# Patient Record
Sex: MALE | Race: WHITE | NOT HISPANIC OR LATINO | RURAL
[De-identification: names, ages, dates, MRNs, and addresses within clinical notes are randomized per-mention and may not be internally consistent; named-entity substitution may affect disease eponyms.]

---

## 2019-06-19 ENCOUNTER — OFFICE VISIT (OUTPATIENT)
Dept: FAMILY MEDICINE CLINIC | Facility: CLINIC | Age: 44
End: 2019-06-19

## 2019-06-19 VITALS
TEMPERATURE: 98 F | WEIGHT: 172.4 LBS | SYSTOLIC BLOOD PRESSURE: 142 MMHG | RESPIRATION RATE: 18 BRPM | DIASTOLIC BLOOD PRESSURE: 92 MMHG | OXYGEN SATURATION: 98 % | HEIGHT: 72 IN | HEART RATE: 83 BPM | BODY MASS INDEX: 23.35 KG/M2

## 2019-06-19 DIAGNOSIS — R39.15 URINARY URGENCY: Primary | ICD-10-CM

## 2019-06-19 DIAGNOSIS — N52.9 DIFFICULTY ATTAINING ERECTION: ICD-10-CM

## 2019-06-19 DIAGNOSIS — M77.01 MEDIAL EPICONDYLITIS OF RIGHT ELBOW: ICD-10-CM

## 2019-06-19 LAB
BILIRUB BLD-MCNC: NEGATIVE MG/DL
GLUCOSE UR STRIP-MCNC: NEGATIVE MG/DL
KETONES UR QL: NEGATIVE
LEUKOCYTE EST, POC: ABNORMAL
NITRITE UR-MCNC: NEGATIVE MG/ML
PH UR: 9 [PH] (ref 5–8)
PROT UR STRIP-MCNC: ABNORMAL MG/DL
RBC # UR STRIP: ABNORMAL /UL
SP GR UR: 1 (ref 1–1.03)
UROBILINOGEN UR QL: NORMAL

## 2019-06-19 PROCEDURE — 99214 OFFICE O/P EST MOD 30 MIN: CPT | Performed by: FAMILY MEDICINE

## 2019-06-19 PROCEDURE — 81002 URINALYSIS NONAUTO W/O SCOPE: CPT | Performed by: FAMILY MEDICINE

## 2019-06-19 RX ORDER — SILDENAFIL CITRATE 20 MG/1
20 TABLET ORAL DAILY
Qty: 25 TABLET | Refills: 3 | Status: SHIPPED | OUTPATIENT
Start: 2019-06-19 | End: 2019-06-19 | Stop reason: SDUPTHER

## 2019-06-19 RX ORDER — EPINEPHRINE 0.3 MG/.3ML
INJECTION SUBCUTANEOUS
Refills: 0 | COMMUNITY
Start: 2019-04-15

## 2019-06-19 RX ORDER — SILDENAFIL CITRATE 20 MG/1
20 TABLET ORAL DAILY
Qty: 25 TABLET | Refills: 3 | Status: SHIPPED | OUTPATIENT
Start: 2019-06-19 | End: 2021-07-19

## 2019-06-19 RX ORDER — FAMOTIDINE 20 MG/1
TABLET, FILM COATED ORAL
Refills: 0 | COMMUNITY
Start: 2019-04-15 | End: 2021-07-19

## 2019-06-19 RX ORDER — AMLODIPINE BESYLATE 10 MG/1
10 TABLET ORAL DAILY
COMMUNITY
End: 2021-07-19 | Stop reason: SDUPTHER

## 2019-06-19 RX ORDER — CIPROFLOXACIN 500 MG/1
500 TABLET, FILM COATED ORAL 2 TIMES DAILY
Qty: 10 TABLET | Refills: 0 | Status: SHIPPED | OUTPATIENT
Start: 2019-06-19 | End: 2019-06-24

## 2019-06-19 NOTE — ASSESSMENT & PLAN NOTE
He was started on Prednisone and NSAIDs to treat his symptoms.   He was advised to use ice and exercise.

## 2019-06-20 ENCOUNTER — RESULTS ENCOUNTER (OUTPATIENT)
Dept: FAMILY MEDICINE CLINIC | Facility: CLINIC | Age: 44
End: 2019-06-20

## 2019-06-20 DIAGNOSIS — R39.15 URINARY URGENCY: ICD-10-CM

## 2019-06-21 LAB
GLUCOSE UR QL: (no result)
KETONES UR QL STRIP: (no result)
PH UR STRIP: (no result) [PH]
PROT UR QL STRIP: (no result)
SP GR UR: (no result)
SPECIMEN STATUS: NORMAL

## 2019-06-27 LAB
BACTERIA UR CULT: NO GROWTH
BACTERIA UR CULT: NORMAL
WRITTEN AUTHORIZATION: NORMAL

## 2019-12-04 ENCOUNTER — HOSPITAL ENCOUNTER (OUTPATIENT)
Dept: GENERAL RADIOLOGY | Facility: HOSPITAL | Age: 44
Discharge: HOME | End: 2019-12-04
Admitting: FAMILY MEDICINE

## 2019-12-04 ENCOUNTER — OFFICE VISIT (OUTPATIENT)
Dept: FAMILY MEDICINE CLINIC | Facility: CLINIC | Age: 44
End: 2019-12-04

## 2019-12-04 VITALS
HEART RATE: 70 BPM | BODY MASS INDEX: 24.24 KG/M2 | DIASTOLIC BLOOD PRESSURE: 86 MMHG | SYSTOLIC BLOOD PRESSURE: 134 MMHG | WEIGHT: 179 LBS | OXYGEN SATURATION: 98 % | HEIGHT: 72 IN | TEMPERATURE: 98 F | RESPIRATION RATE: 18 BRPM

## 2019-12-04 DIAGNOSIS — R07.89 OTHER CHEST PAIN: ICD-10-CM

## 2019-12-04 DIAGNOSIS — R07.89 OTHER CHEST PAIN: Primary | ICD-10-CM

## 2019-12-04 PROCEDURE — 71046 X-RAY EXAM CHEST 2 VIEWS: CPT

## 2019-12-04 PROCEDURE — 99215 OFFICE O/P EST HI 40 MIN: CPT | Performed by: FAMILY MEDICINE

## 2019-12-04 RX ORDER — SILDENAFIL CITRATE 20 MG/1
TABLET ORAL
Refills: 2 | COMMUNITY
Start: 2019-09-27 | End: 2020-01-13 | Stop reason: SDUPTHER

## 2019-12-04 RX ORDER — AMLODIPINE BESYLATE 10 MG/1
10 TABLET ORAL DAILY
COMMUNITY
End: 2021-06-30

## 2019-12-04 NOTE — PROGRESS NOTES
Chief Complaint   Patient presents with   • Chest Pain     over the left side of the heart       History of Present Illness:  Steven Bazzi is a 44 y.o. male.   History of Present Illness   Chest Pain:   Patient reports that he has been having a squeezing chest pain in the left side of his chest; off and on for about a week- each episode lasts several minutes. He cannot relate this feeling to any stress, or alcohol consumption etc.    He states that he has some stabbing pain in the left side of his chest and up into his shoulder.   He reports that he has also had a cough, he isn't sure if it's related to the chest pain.   He also reports that he doesn't get adequate sleep because he works swing shift from nights/days.       Allergies:  No Known Allergies    Social History:  Social History     Socioeconomic History   • Marital status:      Spouse name: Not on file   • Number of children: Not on file   • Years of education: Not on file   • Highest education level: Not on file   Tobacco Use   • Smoking status: Current Every Day Smoker       Family History:  History reviewed. No pertinent family history.    Past Medical History :  Active Ambulatory Problems     Diagnosis Date Noted   • Other chest pain 12/28/2019     Resolved Ambulatory Problems     Diagnosis Date Noted   • No Resolved Ambulatory Problems     No Additional Past Medical History       Medication List:    Current Outpatient Medications:   •  amLODIPine (NORVASC) 10 MG tablet, Take 10 mg by mouth Daily., Disp: , Rfl:   •  sildenafil (REVATIO) 20 MG tablet, TAKE 1 TO 3 TABLETS BY MOUTH 30 PRIOR TO SEXUAL ACTIVITY, Disp: , Rfl: 2    Past Surgical History:  History reviewed. No pertinent surgical history.     The following portions of the patient's history were reviewed and updated as appropriate: allergies, current medications, past family history, past medical history, past social history, past surgical history and problem  "list.    Review Of Systems:  Review of Systems   Constitutional: Negative for activity change, appetite change and fatigue.   HENT: Negative for congestion, postnasal drip, sinus pressure and sore throat.    Eyes: Negative for blurred vision and itching.   Respiratory: Negative for cough, shortness of breath and wheezing.    Cardiovascular: Positive for chest pain.   Gastrointestinal: Negative for abdominal pain, constipation, nausea, vomiting and GERD.   Endocrine: Negative for cold intolerance and heat intolerance.   Genitourinary: Negative for difficulty urinating, dysuria and urinary incontinence.   Musculoskeletal: Negative for back pain, joint swelling and neck pain.   Skin: Negative for color change and rash.   Neurological: Negative for dizziness, speech difficulty, weakness and memory problem.   Psychiatric/Behavioral: Negative for behavioral problems, decreased concentration, suicidal ideas and depressed mood.       Physical Exam:  Vital Signs:  Vitals:    12/04/19 1155   BP: 134/86   Pulse: 70   Resp: 18   Temp: 98 °F (36.7 °C)   SpO2: 98%   Weight: 81.2 kg (179 lb)   Height: 182.9 cm (72\")     Body mass index is 24.28 kg/m².    Physical Exam   Constitutional: He is oriented to person, place, and time. He appears well-developed and well-nourished. He is active.   HENT:   Head: Normocephalic and atraumatic.   Nose: Nose normal.   Eyes: Pupils are equal, round, and reactive to light. Conjunctivae and lids are normal.   Neck: Normal range of motion. Neck supple.   Cardiovascular: Normal rate, regular rhythm, normal heart sounds and normal pulses.   Pulmonary/Chest: Effort normal and breath sounds normal. No respiratory distress.   Abdominal: Soft. Bowel sounds are normal.   Musculoskeletal: Normal range of motion.   Neurological: He is alert and oriented to person, place, and time.   Skin: Skin is warm and dry. Capillary refill takes less than 2 seconds.   Psychiatric: He has a normal mood and affect. His " behavior is normal. Judgment and thought content normal.   Vitals reviewed.        Assessment and Plan:  Diagnoses and all orders for this visit:    1. Other chest pain (Primary)  Assessment & Plan:  EKG was wnl.  Cardiac workup was wnl.  This could likely be due to costochondritis.   If this continues he will need to get a stress Echo.      Orders:  -     ECG 12 Lead; Future  -     XR Chest PA & Lateral  -     C-reactive Protein; Future  -     CBC Auto Differential; Future  -     CK Total & CKMB; Future  -     Troponin I; Future      Greater than 50% of visit spent on counseling and coordination of care regarding the patient's illness, along with the pros and cons of various treatment options.

## 2019-12-09 ENCOUNTER — RESULTS ENCOUNTER (OUTPATIENT)
Dept: FAMILY MEDICINE CLINIC | Facility: CLINIC | Age: 44
End: 2019-12-09

## 2019-12-09 DIAGNOSIS — R07.89 OTHER CHEST PAIN: ICD-10-CM

## 2019-12-28 PROBLEM — R07.89 OTHER CHEST PAIN: Status: ACTIVE | Noted: 2019-12-28

## 2019-12-28 NOTE — ASSESSMENT & PLAN NOTE
EKG was wnl.  Cardiac workup was wnl.  This could likely be due to costochondritis.   If this continues he will need to get a stress Echo.

## 2020-01-13 DIAGNOSIS — N52.9 DIFFICULTY ATTAINING ERECTION: Primary | ICD-10-CM

## 2020-01-13 RX ORDER — SILDENAFIL CITRATE 20 MG/1
TABLET ORAL
Qty: 60 TABLET | Refills: 2 | Status: SHIPPED | OUTPATIENT
Start: 2020-01-13 | End: 2021-06-30

## 2020-03-24 RX ORDER — AMLODIPINE BESYLATE 10 MG/1
TABLET ORAL
Qty: 90 TABLET | Refills: 3 | Status: SHIPPED | OUTPATIENT
Start: 2020-03-24 | End: 2021-06-30

## 2021-03-19 RX ORDER — AMLODIPINE BESYLATE 10 MG/1
TABLET ORAL
Qty: 90 TABLET | Refills: 3 | OUTPATIENT
Start: 2021-03-19

## 2021-06-07 DIAGNOSIS — N52.9 DIFFICULTY ATTAINING ERECTION: ICD-10-CM

## 2021-06-07 RX ORDER — SILDENAFIL CITRATE 20 MG/1
TABLET ORAL
Qty: 60 TABLET | Refills: 2 | OUTPATIENT
Start: 2021-06-07

## 2021-06-14 DIAGNOSIS — N52.9 DIFFICULTY ATTAINING ERECTION: ICD-10-CM

## 2021-06-14 NOTE — TELEPHONE ENCOUNTER
Caller: Rose Mary Pio KENISHA MARVIN    Relationship: Self    Best call back number: [phone]    Medication needed:   Requested Prescriptions     Pending Prescriptions Disp Refills   • sildenafil (REVATIO) 20 MG tablet 60 tablet 2     Sig: Take 1-3 tablets 30 minutes prior to sexual activity       When do you need the refill by: ASAP      Does the patient have less than a 3 day supply:  [x] Yes  [] No    What is the patient's preferred pharmacy: [facility] [place], [place] -[id number] [place], [place] - [address] [facility] [phone] [facility] [phone]

## 2021-06-16 RX ORDER — SILDENAFIL CITRATE 20 MG/1
TABLET ORAL
Qty: 60 TABLET | Refills: 2 | OUTPATIENT
Start: 2021-06-16

## 2021-06-30 ENCOUNTER — OFFICE VISIT (OUTPATIENT)
Dept: FAMILY MEDICINE CLINIC | Facility: CLINIC | Age: 46
End: 2021-06-30

## 2021-06-30 VITALS
HEIGHT: 70 IN | BODY MASS INDEX: 25.88 KG/M2 | WEIGHT: 180.8 LBS | HEART RATE: 86 BPM | RESPIRATION RATE: 18 BRPM | OXYGEN SATURATION: 96 % | DIASTOLIC BLOOD PRESSURE: 80 MMHG | TEMPERATURE: 97.3 F | SYSTOLIC BLOOD PRESSURE: 130 MMHG

## 2021-06-30 DIAGNOSIS — R19.7 DIARRHEA, UNSPECIFIED TYPE: ICD-10-CM

## 2021-06-30 DIAGNOSIS — R10.84 GENERALIZED ABDOMINAL DISCOMFORT: Primary | ICD-10-CM

## 2021-06-30 DIAGNOSIS — F17.200 TOBACCO USE DISORDER: ICD-10-CM

## 2021-06-30 DIAGNOSIS — K21.00 GASTROESOPHAGEAL REFLUX DISEASE WITH ESOPHAGITIS WITHOUT HEMORRHAGE: ICD-10-CM

## 2021-06-30 DIAGNOSIS — R35.0 URINARY FREQUENCY: ICD-10-CM

## 2021-06-30 PROBLEM — L40.9 PSORIASIS: Status: ACTIVE | Noted: 2021-06-30

## 2021-06-30 PROBLEM — I10 HYPERTENSION: Status: ACTIVE | Noted: 2021-06-30

## 2021-06-30 PROBLEM — M54.50 ACUTE MIDLINE LOW BACK PAIN WITHOUT SCIATICA: Status: ACTIVE | Noted: 2021-06-30

## 2021-06-30 LAB
BILIRUB BLD-MCNC: NEGATIVE MG/DL
CLARITY, POC: CLEAR
COLOR UR: ABNORMAL
GLUCOSE UR STRIP-MCNC: NEGATIVE MG/DL
KETONES UR QL: NEGATIVE
LEUKOCYTE EST, POC: NEGATIVE
NITRITE UR-MCNC: NEGATIVE MG/ML
PH UR: 6.5 [PH] (ref 5–8)
PROT UR STRIP-MCNC: NEGATIVE MG/DL
RBC # UR STRIP: ABNORMAL /UL
SP GR UR: 1.02 (ref 1–1.03)
UROBILINOGEN UR QL: NORMAL

## 2021-06-30 PROCEDURE — 99214 OFFICE O/P EST MOD 30 MIN: CPT | Performed by: FAMILY MEDICINE

## 2021-06-30 PROCEDURE — 81002 URINALYSIS NONAUTO W/O SCOPE: CPT | Performed by: FAMILY MEDICINE

## 2021-06-30 RX ORDER — DIPHENOXYLATE HYDROCHLORIDE AND ATROPINE SULFATE 2.5; .025 MG/1; MG/1
1 TABLET ORAL 3 TIMES DAILY PRN
Qty: 30 TABLET | Refills: 0 | Status: SHIPPED | OUTPATIENT
Start: 2021-06-30 | End: 2022-08-15

## 2021-06-30 RX ORDER — IBUPROFEN 800 MG/1
TABLET ORAL
COMMUNITY
Start: 2017-03-24 | End: 2021-07-19

## 2021-06-30 RX ORDER — GABAPENTIN 300 MG/1
1 CAPSULE ORAL 3 TIMES DAILY
COMMUNITY
Start: 2018-09-27 | End: 2021-06-30

## 2021-06-30 RX ORDER — OMEPRAZOLE 40 MG/1
40 CAPSULE, DELAYED RELEASE ORAL 2 TIMES DAILY
Qty: 60 CAPSULE | Refills: 12 | Status: SHIPPED | OUTPATIENT
Start: 2021-06-30 | End: 2021-09-07 | Stop reason: SDUPTHER

## 2021-07-01 LAB
ALBUMIN SERPL-MCNC: 4.3 G/DL (ref 4–5)
ALBUMIN/GLOB SERPL: 2 {RATIO} (ref 1.2–2.2)
ALP SERPL-CCNC: 71 IU/L (ref 48–121)
ALT SERPL-CCNC: 13 IU/L (ref 0–44)
AMYLASE SERPL-CCNC: 53 U/L (ref 31–110)
APPEARANCE UR: CLEAR
AST SERPL-CCNC: 16 IU/L (ref 0–40)
BACTERIA #/AREA URNS HPF: NORMAL /[HPF]
BASOPHILS # BLD AUTO: 0.1 X10E3/UL (ref 0–0.2)
BASOPHILS NFR BLD AUTO: 1 %
BILIRUB SERPL-MCNC: 0.3 MG/DL (ref 0–1.2)
BILIRUB UR QL STRIP: NEGATIVE
BUN SERPL-MCNC: 7 MG/DL (ref 6–24)
BUN/CREAT SERPL: 7 (ref 9–20)
CALCIUM SERPL-MCNC: 9.4 MG/DL (ref 8.7–10.2)
CASTS URNS QL MICRO: NORMAL /LPF
CHLORIDE SERPL-SCNC: 99 MMOL/L (ref 96–106)
CO2 SERPL-SCNC: 26 MMOL/L (ref 20–29)
COLOR UR: YELLOW
CREAT SERPL-MCNC: 1.01 MG/DL (ref 0.76–1.27)
EOSINOPHIL # BLD AUTO: 0.2 X10E3/UL (ref 0–0.4)
EOSINOPHIL NFR BLD AUTO: 3 %
EPI CELLS #/AREA URNS HPF: NORMAL /HPF (ref 0–10)
ERYTHROCYTE [DISTWIDTH] IN BLOOD BY AUTOMATED COUNT: 13 % (ref 11.6–15.4)
GLOBULIN SER CALC-MCNC: 2.2 G/DL (ref 1.5–4.5)
GLUCOSE SERPL-MCNC: 77 MG/DL (ref 65–99)
GLUCOSE UR QL: NEGATIVE
HCT VFR BLD AUTO: 48.7 % (ref 37.5–51)
HGB BLD-MCNC: 15.9 G/DL (ref 13–17.7)
HGB UR QL STRIP: ABNORMAL
IMM GRANULOCYTES # BLD AUTO: 0 X10E3/UL (ref 0–0.1)
IMM GRANULOCYTES NFR BLD AUTO: 0 %
KETONES UR QL STRIP: ABNORMAL
LEUKOCYTE ESTERASE UR QL STRIP: NEGATIVE
LIPASE SERPL-CCNC: 32 U/L (ref 13–78)
LYMPHOCYTES # BLD AUTO: 1.3 X10E3/UL (ref 0.7–3.1)
LYMPHOCYTES NFR BLD AUTO: 17 %
MCH RBC QN AUTO: 30.2 PG (ref 26.6–33)
MCHC RBC AUTO-ENTMCNC: 32.6 G/DL (ref 31.5–35.7)
MCV RBC AUTO: 93 FL (ref 79–97)
MICRO URNS: ABNORMAL
MONOCYTES # BLD AUTO: 1.1 X10E3/UL (ref 0.1–0.9)
MONOCYTES NFR BLD AUTO: 14 %
NEUTROPHILS # BLD AUTO: 5 X10E3/UL (ref 1.4–7)
NEUTROPHILS NFR BLD AUTO: 65 %
NITRITE UR QL STRIP: NEGATIVE
PH UR STRIP: 6.5 [PH] (ref 5–7.5)
PLATELET # BLD AUTO: 284 X10E3/UL (ref 150–450)
POTASSIUM SERPL-SCNC: 4.3 MMOL/L (ref 3.5–5.2)
PROT SERPL-MCNC: 6.5 G/DL (ref 6–8.5)
PROT UR QL STRIP: ABNORMAL
RBC # BLD AUTO: 5.26 X10E6/UL (ref 4.14–5.8)
RBC #/AREA URNS HPF: NORMAL /HPF (ref 0–2)
SODIUM SERPL-SCNC: 139 MMOL/L (ref 134–144)
SP GR UR: 1.02 (ref 1–1.03)
UROBILINOGEN UR STRIP-MCNC: 0.2 MG/DL (ref 0.2–1)
WBC # BLD AUTO: 7.7 X10E3/UL (ref 3.4–10.8)
WBC #/AREA URNS HPF: NORMAL /HPF (ref 0–5)

## 2021-07-02 ENCOUNTER — TELEPHONE (OUTPATIENT)
Dept: FAMILY MEDICINE CLINIC | Facility: CLINIC | Age: 46
End: 2021-07-02

## 2021-07-02 RX ORDER — MONTELUKAST SODIUM 4 MG/1
1 TABLET, CHEWABLE ORAL 2 TIMES DAILY
Qty: 60 TABLET | Refills: 0 | Status: SHIPPED | OUTPATIENT
Start: 2021-07-02

## 2021-07-02 NOTE — TELEPHONE ENCOUNTER
Spoke with Pio he reports he continue to have loose stool.    Discussed with Pio, per Dr. Pierre medication was sent to pharmacy to help make stool bulky, and should   supplies from this office to do stool cx and   C. Diff at [facility]    Pio states he is out of town and will be out of town until Monday,  He will pick rx up then ,and if continue to have loose stool at that time will stop by office Tuesday for supplies for stool testing.

## 2021-07-19 ENCOUNTER — OFFICE VISIT (OUTPATIENT)
Dept: FAMILY MEDICINE CLINIC | Facility: CLINIC | Age: 46
End: 2021-07-19

## 2021-07-19 VITALS
BODY MASS INDEX: 25.65 KG/M2 | WEIGHT: 179.2 LBS | HEIGHT: 70 IN | HEART RATE: 78 BPM | SYSTOLIC BLOOD PRESSURE: 110 MMHG | RESPIRATION RATE: 18 BRPM | TEMPERATURE: 98.1 F | DIASTOLIC BLOOD PRESSURE: 70 MMHG | OXYGEN SATURATION: 95 %

## 2021-07-19 DIAGNOSIS — I10 ESSENTIAL HYPERTENSION: Primary | ICD-10-CM

## 2021-07-19 DIAGNOSIS — N52.9 DIFFICULTY ATTAINING ERECTION: ICD-10-CM

## 2021-07-19 DIAGNOSIS — R10.84 GENERALIZED ABDOMINAL PAIN: ICD-10-CM

## 2021-07-19 PROCEDURE — 99214 OFFICE O/P EST MOD 30 MIN: CPT | Performed by: FAMILY MEDICINE

## 2021-07-19 RX ORDER — SUCRALFATE 1 G/1
1 TABLET ORAL 4 TIMES DAILY
Qty: 120 TABLET | Refills: 12 | Status: SHIPPED | OUTPATIENT
Start: 2021-07-19 | End: 2021-09-07 | Stop reason: SDUPTHER

## 2021-07-19 RX ORDER — AMLODIPINE BESYLATE 10 MG/1
10 TABLET ORAL DAILY
Qty: 90 TABLET | Refills: 2 | Status: SHIPPED | OUTPATIENT
Start: 2021-07-19 | End: 2021-09-07 | Stop reason: SDUPTHER

## 2021-07-19 RX ORDER — DICYCLOMINE HYDROCHLORIDE 10 MG/1
10 CAPSULE ORAL
Qty: 90 CAPSULE | Refills: 2 | Status: SHIPPED | OUTPATIENT
Start: 2021-07-19 | End: 2021-09-07 | Stop reason: SDUPTHER

## 2021-07-19 RX ORDER — TADALAFIL 10 MG/1
10 TABLET ORAL DAILY PRN
Qty: 90 TABLET | Refills: 3 | Status: SHIPPED | OUTPATIENT
Start: 2021-07-19 | End: 2022-08-15 | Stop reason: SDUPTHER

## 2021-07-19 NOTE — ASSESSMENT & PLAN NOTE
Patient was started on Carafate to help treat symptoms.  Patient was also started on dicyclomine for abdominal pain and diarrhea.  Patient was encouraged to return to clinic if symptoms do not improve.

## 2021-07-19 NOTE — PROGRESS NOTES
Chief Complaint  Abdominal Pain    Subjective    History of Present Illness        Pio Bazzi II presents to [facility] for   Abdominal Pain  This is a recurrent problem. The current episode started more than 1 month ago. The onset quality is gradual. The problem occurs intermittently. The problem has been gradually improving. The pain is located in the generalized abdominal region (lower mid abdominal issues ). The pain is at a severity of 3/10. The pain is mild. The quality of the pain is aching. Associated symptoms include diarrhea (on and off ) and flatus. Pertinent negatives include no anorexia, arthralgias, belching, constipation, dysuria, fever, frequency, headaches, hematochezia, hematuria, melena, myalgias, nausea, vomiting or weight loss. Associated symptoms comments: Patient states that he was here a month or so ago and he states that he was seen by another provider and he was very very gassy and he states that he had some abdominal discomfort and he states that he had a lot of diarrhea as well. He states that he was started in Nexium to treat and he states that it has helped him but he states that he Is still gassy and he states that his stomach just does not feel right. He states that his acid reflux is a lot better but he states that he feels there is something more. He states that at times it feels he has eating  the spiciest hot wing and it is sitting there. . The pain is aggravated by NSAIDs and certain positions. The pain is relieved by belching. The treatment provided mild relief.   Hypertension  This is a chronic problem. The current episode started more than 1 year ago. The problem is unchanged. The problem is controlled. Pertinent negatives include no chest pain, headaches, peripheral edema or shortness of breath. Agents associated with hypertension include NSAIDs. Risk factors for coronary artery disease include male gender, sedentary lifestyle and family  "history. Past treatments include direct vasodilators. Current antihypertension treatment includes lifestyle changes and calcium channel blockers. There are no compliance problems.  There is no history of CVA or heart failure. There is no history of a hypertension causing med, pheochromocytoma, renovascular disease or sleep apnea.   Erectile Dysfunction  This is a chronic problem. The current episode started more than 1 year ago. The nature of his difficulty is maintaining erection and achieving erection. Irritative symptoms do not include frequency. Pertinent negatives include no dysuria or hematuria. Nothing aggravates the symptoms. Past treatments include sildenafil. The treatment provided mild relief. He has had flushing and headaches caused by medications.        Objective   Vital Signs:   Visit Vitals  /70   Pulse 78   Temp 98.1 °F (36.7 °C)   Resp 18   Ht 177.8 cm (70\")   Wt 81.3 kg (179 lb 3.2 oz)   SpO2 95%   BMI 25.71 kg/m²       Physical Exam  Vitals reviewed.   Constitutional:       Appearance: He is well-developed.   HENT:      Head: Normocephalic.      Right Ear: External ear normal.      Left Ear: External ear normal.      Nose: Nose normal.   Eyes:      Conjunctiva/sclera: Conjunctivae normal.   Cardiovascular:      Rate and Rhythm: Normal rate and regular rhythm.   Pulmonary:      Effort: Pulmonary effort is normal.      Breath sounds: Normal breath sounds.   Abdominal:      General: Abdomen is flat. Bowel sounds are normal.      Palpations: Abdomen is soft.      Tenderness: There is no guarding.   Musculoskeletal:         General: Normal range of motion.      Cervical back: Normal range of motion and neck supple.   Skin:     General: Skin is warm and dry.      Capillary Refill: Capillary refill takes less than 2 seconds.   Neurological:      Mental Status: He is alert and oriented to person, place, and time.            Result Review :                  Recent Results (from the past 1344 " hour(s))   CBC & Differential    Collection Time: 06/30/21 12:15 PM    Specimen: Blood    BLOOD  MANUAL DIFFEREN   Result Value Ref Range    WBC 7.7 3.4 - 10.8 x10E3/uL    RBC 5.26 4.14 - 5.80 x10E6/uL    Hemoglobin 15.9 13.0 - 17.7 g/dL    Hematocrit 48.7 37.5 - 51.0 %    MCV 93 79 - 97 fL    MCH 30.2 26.6 - 33.0 pg    MCHC 32.6 31.5 - 35.7 g/dL    RDW 13.0 11.6 - 15.4 %    Platelets 284 150 - 450 x10E3/uL    Neutrophil Rel % 65 Not Estab. %    Lymphocyte Rel % 17 Not Estab. %    Monocyte Rel % 14 Not Estab. %    Eosinophil Rel % 3 Not Estab. %    Basophil Rel % 1 Not Estab. %    Neutrophils Absolute 5.0 1.4 - 7.0 x10E3/uL    Lymphocytes Absolute 1.3 0.7 - 3.1 x10E3/uL    Monocytes Absolute 1.1 (H) 0.1 - 0.9 x10E3/uL    Eosinophils Absolute 0.2 0.0 - 0.4 x10E3/uL    Basophils Absolute 0.1 0.0 - 0.2 x10E3/uL    Immature Granulocyte Rel % 0 Not Estab. %    Immature Grans Absolute 0.0 0.0 - 0.1 x10E3/uL   Comprehensive Metabolic Panel    Collection Time: 06/30/21 12:15 PM    Specimen: Blood    BLOOD  MANUAL DIFFEREN   Result Value Ref Range    Glucose 77 65 - 99 mg/dL    BUN 7 6 - 24 mg/dL    Creatinine 1.01 0.76 - 1.27 mg/dL    eGFR Non African Am 89 >59 mL/min/1.73    eGFR African Am 103 >59 mL/min/1.73    BUN/Creatinine Ratio 7 (L) 9 - 20    Sodium 139 134 - 144 mmol/L    Potassium 4.3 3.5 - 5.2 mmol/L    Chloride 99 96 - 106 mmol/L    Total CO2 26 20 - 29 mmol/L    Calcium 9.4 8.7 - 10.2 mg/dL    Total Protein 6.5 6.0 - 8.5 g/dL    Albumin 4.3 4.0 - 5.0 g/dL    Globulin 2.2 1.5 - 4.5 g/dL    A/G Ratio 2.0 1.2 - 2.2    Total Bilirubin 0.3 0.0 - 1.2 mg/dL    Alkaline Phosphatase 71 48 - 121 IU/L    AST (SGOT) 16 0 - 40 IU/L    ALT (SGPT) 13 0 - 44 IU/L   Amylase    Collection Time: 06/30/21 12:15 PM    Specimen: Blood    BLOOD  MANUAL DIFFEREN   Result Value Ref Range    Amylase 53 31 - 110 U/L   Lipase    Collection Time: 06/30/21 12:15 PM    Specimen: Blood    BLOOD  MANUAL DIFFEREN   Result Value Ref Range     Lipase 32 13 - 78 U/L   POCT urinalysis dipstick, manual    Collection Time: 06/30/21 12:16 PM    Specimen: Urine   Result Value Ref Range    Color Dark Yellow Yellow, Straw, Dark Yellow, Meg    Clarity, UA Clear Clear    Glucose, UA Negative Negative, 1000 mg/dL (3+) mg/dL    Bilirubin Negative Negative    Ketones, UA Negative Negative    Specific Gravity  1.025 1.005 - 1.030    Blood, UA 3+ (A) Negative    pH, Urine 6.5 5.0 - 8.0    Protein, POC Negative Negative mg/dL    Urobilinogen, UA Normal Normal    Leukocytes Negative Negative    Nitrite, UA Negative Negative   Urinalysis With Microscopic - Urine, Clean Catch    Collection Time: 06/30/21  5:18 PM    Specimen: Urine, Clean Catch    URINE  RELEASE TO FIDEL   Result Value Ref Range    Specific Gravity, UA 1.023 1.005 - 1.030    pH, UA 6.5 5.0 - 7.5    Color, UA Yellow Yellow    Appearance, UA Clear Clear    Leukocytes, UA Negative Negative    Protein Trace Negative/Trace    Glucose, UA Negative Negative    Ketones Trace (A) Negative    Blood, UA 1+ (A) Negative    Bilirubin, UA Negative Negative    Urobilinogen, UA 0.2 0.2 - 1.0 mg/dL    Nitrite, UA Negative Negative    Microscopic Examination See below:    Microscopic Examination -    Collection Time: 06/30/21  5:18 PM    URINE  RELEASE TO FIDEL   Result Value Ref Range    WBC, UA None seen 0 - 5 /hpf    RBC, UA None seen 0 - 2 /hpf    Epithelial Cells (non renal) None seen 0 - 10 /hpf    Casts None seen None seen /lpf    Bacteria, UA None seen None seen/Few       Assessment and Plan      Diagnoses and all orders for this visit:    1. Essential hypertension (Primary)  Assessment & Plan:  Hypertension is improving with treatment.  Continue current treatment regimen.  Dietary sodium restriction.  Weight loss.  Regular aerobic exercise.  Blood pressure will be reassessed at the next regular appointment.    Orders:  -     amLODIPine (NORVASC) 10 MG tablet; Take 1 tablet by mouth Daily.  Dispense: 90 tablet;  Refill: 2    2. Generalized abdominal pain  Assessment & Plan:  Patient was started on Carafate to help treat symptoms.  Patient was also started on dicyclomine for abdominal pain and diarrhea.  Patient was encouraged to return to clinic if symptoms do not improve.    Orders:  -     sucralfate (CARAFATE) 1 g tablet; Take 1 tablet by mouth 4 (Four) Times a Day.  Dispense: 120 tablet; Refill: 12  -     dicyclomine (Bentyl) 10 MG capsule; Take 1 capsule by mouth 4 (Four) Times a Day Before Meals & at Bedtime.  Dispense: 90 capsule; Refill: 2    3. Difficulty attaining erection  Assessment & Plan:  Patient was advised to stop sildenafil and was started on tadalafil.    Orders:  -     tadalafil (CIALIS) 10 MG tablet; Take 1 tablet by mouth Daily As Needed for Erectile Dysfunction.  Dispense: 90 tablet; Refill: 3           Follow Up   No follow-ups on file.  Patient was given instructions and counseling regarding his condition or for health maintenance advice. Please see specific information pulled into the AVS if appropriate.

## 2021-09-07 DIAGNOSIS — R10.84 GENERALIZED ABDOMINAL PAIN: ICD-10-CM

## 2021-09-07 DIAGNOSIS — I10 ESSENTIAL HYPERTENSION: ICD-10-CM

## 2021-09-07 RX ORDER — OMEPRAZOLE 40 MG/1
40 CAPSULE, DELAYED RELEASE ORAL 2 TIMES DAILY
Qty: 180 CAPSULE | Refills: 3 | Status: SHIPPED | OUTPATIENT
Start: 2021-09-07 | End: 2021-12-03 | Stop reason: SDUPTHER

## 2021-09-08 RX ORDER — DICYCLOMINE HYDROCHLORIDE 10 MG/1
10 CAPSULE ORAL
Qty: 90 CAPSULE | Refills: 2 | Status: SHIPPED | OUTPATIENT
Start: 2021-09-08 | End: 2022-08-15

## 2021-09-08 RX ORDER — AMLODIPINE BESYLATE 10 MG/1
10 TABLET ORAL DAILY
Qty: 90 TABLET | Refills: 2 | Status: SHIPPED | OUTPATIENT
Start: 2021-09-08 | End: 2021-11-03 | Stop reason: SDUPTHER

## 2021-09-08 RX ORDER — SUCRALFATE 1 G/1
1 TABLET ORAL 4 TIMES DAILY
Qty: 120 TABLET | Refills: 12 | Status: SHIPPED | OUTPATIENT
Start: 2021-09-08 | End: 2022-03-12 | Stop reason: SDUPTHER

## 2021-11-03 DIAGNOSIS — I10 ESSENTIAL HYPERTENSION: ICD-10-CM

## 2021-11-03 RX ORDER — AMLODIPINE BESYLATE 10 MG/1
10 TABLET ORAL DAILY
Qty: 90 TABLET | Refills: 2 | Status: SHIPPED | OUTPATIENT
Start: 2021-11-03 | End: 2021-11-29 | Stop reason: SDUPTHER

## 2021-11-03 NOTE — TELEPHONE ENCOUNTER
Caller: Pio Bazzi KENISHA MARVIN    Relationship: Self    Requested Prescriptions:   Requested Prescriptions     Pending Prescriptions Disp Refills   • amLODIPine (NORVASC) 10 MG tablet 90 tablet 2     Sig: Take 1 tablet by mouth Daily.      Pharmacy where request should be sent:   [id number] - [place], [place] - [address] AT [facility] OF  &  - [phone] PH - [phone] FX  [phone]    Additional details provided by patient: PATIENT IS REQUESTING A BRIDGE SUPPLY ONLY.  HE STATES THAT HIS FIRST REQUEST FOR THIS MEDICATION TO [facility], SENT VIA [employer], IS STILL PENDING AND HAS NOT BEEN APPROVED/PROCESSED AND HE IS OUT OF MEDICATION.    Best call back number: [phone] .    Does the patient have less than a 3 day supply:  [x] Yes  [] No    Serena Cui Rep   11/03/21 11:29 EDT

## 2021-11-29 DIAGNOSIS — I10 ESSENTIAL HYPERTENSION: ICD-10-CM

## 2021-11-29 RX ORDER — AMLODIPINE BESYLATE 10 MG/1
10 TABLET ORAL DAILY
Qty: 90 TABLET | Refills: 2 | Status: SHIPPED | OUTPATIENT
Start: 2021-11-29 | End: 2022-03-12 | Stop reason: SDUPTHER

## 2021-12-03 RX ORDER — OMEPRAZOLE 40 MG/1
40 CAPSULE, DELAYED RELEASE ORAL 2 TIMES DAILY
Qty: 180 CAPSULE | Refills: 3 | Status: SHIPPED | OUTPATIENT
Start: 2021-12-03 | End: 2021-12-06 | Stop reason: SDUPTHER

## 2021-12-06 RX ORDER — OMEPRAZOLE 40 MG/1
40 CAPSULE, DELAYED RELEASE ORAL 2 TIMES DAILY
Qty: 180 CAPSULE | Refills: 3 | Status: SHIPPED | OUTPATIENT
Start: 2021-12-06 | End: 2022-03-12 | Stop reason: SDUPTHER

## 2021-12-06 NOTE — TELEPHONE ENCOUNTER
Caller: Pio Bazzi II    Relationship: Self    Best call back number: [phone]    Requested Prescriptions:   Requested Prescriptions     Pending Prescriptions Disp Refills   • omeprazole (priLOSEC) 40 MG capsule 180 capsule 3     Sig: Take 1 capsule by mouth 2 (Two) Times a Day.        Pharmacy where request should be sent: [id number] [place] [address] [facility] OF  [address] [facility] - [phone] [facility] [phone] FX     Additional details provided by patient: PATIENT HAS 1 PILL LEFT ANDS IS WAITING ON MAIL ORDER. REQUESTING A SHORT TERM SUPPLY SENT TO LOCAL PHARMACY     Does the patient have less than a 3 day supply:  [] Yes  [] No    Serena Peña Rep   12/06/21 15:30 EST

## 2022-01-21 ENCOUNTER — HOSPITAL ENCOUNTER (OUTPATIENT)
Dept: GENERAL RADIOLOGY | Facility: HOSPITAL | Age: 47
Discharge: HOME | End: 2022-01-21
Admitting: FAMILY MEDICINE

## 2022-01-21 ENCOUNTER — OFFICE VISIT (OUTPATIENT)
Dept: FAMILY MEDICINE CLINIC | Facility: CLINIC | Age: 47
End: 2022-01-21

## 2022-01-21 VITALS
OXYGEN SATURATION: 98 % | TEMPERATURE: 98.2 F | SYSTOLIC BLOOD PRESSURE: 132 MMHG | HEART RATE: 82 BPM | BODY MASS INDEX: 26.66 KG/M2 | HEIGHT: 70 IN | DIASTOLIC BLOOD PRESSURE: 90 MMHG | WEIGHT: 186.2 LBS | RESPIRATION RATE: 14 BRPM

## 2022-01-21 DIAGNOSIS — R07.81 RIB PAIN ON LEFT SIDE: Primary | ICD-10-CM

## 2022-01-21 DIAGNOSIS — F17.200 TOBACCO USE DISORDER: ICD-10-CM

## 2022-01-21 PROCEDURE — 99214 OFFICE O/P EST MOD 30 MIN: CPT | Performed by: FAMILY MEDICINE

## 2022-01-21 PROCEDURE — 71101 X-RAY EXAM UNILAT RIBS/CHEST: CPT

## 2022-01-21 RX ORDER — BUPROPION HYDROCHLORIDE 150 MG/1
150 TABLET, EXTENDED RELEASE ORAL 2 TIMES DAILY
Qty: 180 TABLET | Refills: 2 | Status: SHIPPED | OUTPATIENT
Start: 2022-01-21 | End: 2022-03-12 | Stop reason: SDUPTHER

## 2022-01-21 RX ORDER — BUPROPION HYDROCHLORIDE 150 MG/1
150 TABLET ORAL DAILY
Qty: 30 TABLET | Refills: 0 | Status: CANCELLED | OUTPATIENT
Start: 2022-01-21

## 2022-01-21 RX ORDER — BUPROPION HYDROCHLORIDE 150 MG/1
150 TABLET ORAL DAILY
COMMUNITY
End: 2022-01-21

## 2022-02-23 PROBLEM — R07.81 RIB PAIN ON LEFT SIDE: Status: ACTIVE | Noted: 2022-02-23

## 2022-02-23 NOTE — ASSESSMENT & PLAN NOTE
Due to patient's pain an x-ray was ordered and reviewed.  Patient was advised to use heat, NSAIDs and Voltaren gel to help treat his symptoms.  Patient was encouraged to return to clinic if her symptoms did not improve.

## 2022-02-23 NOTE — ASSESSMENT & PLAN NOTE
Patient was started on Wellbutrin to help treat his symptoms of tobacco abuse.  Patient was encouraged to return to clinic if his symptoms did not improve.

## 2022-03-12 DIAGNOSIS — I10 ESSENTIAL HYPERTENSION: ICD-10-CM

## 2022-03-12 DIAGNOSIS — R10.84 GENERALIZED ABDOMINAL PAIN: ICD-10-CM

## 2022-03-12 DIAGNOSIS — F17.200 TOBACCO USE DISORDER: ICD-10-CM

## 2022-03-12 RX ORDER — OMEPRAZOLE 40 MG/1
40 CAPSULE, DELAYED RELEASE ORAL 2 TIMES DAILY
Qty: 180 CAPSULE | Refills: 3 | Status: SHIPPED | OUTPATIENT
Start: 2022-03-12 | End: 2022-04-06 | Stop reason: SDUPTHER

## 2022-03-12 RX ORDER — BUPROPION HYDROCHLORIDE 150 MG/1
150 TABLET, EXTENDED RELEASE ORAL 2 TIMES DAILY
Qty: 180 TABLET | Refills: 2 | Status: SHIPPED | OUTPATIENT
Start: 2022-03-12 | End: 2022-04-06 | Stop reason: SDUPTHER

## 2022-03-12 RX ORDER — AMLODIPINE BESYLATE 10 MG/1
10 TABLET ORAL DAILY
Qty: 90 TABLET | Refills: 2 | Status: SHIPPED | OUTPATIENT
Start: 2022-03-12 | End: 2022-04-06 | Stop reason: SDUPTHER

## 2022-03-12 RX ORDER — SUCRALFATE 1 G/1
1 TABLET ORAL 4 TIMES DAILY
Qty: 120 TABLET | Refills: 12 | Status: SHIPPED | OUTPATIENT
Start: 2022-03-12 | End: 2022-08-15

## 2022-04-06 DIAGNOSIS — F17.200 TOBACCO USE DISORDER: ICD-10-CM

## 2022-04-06 DIAGNOSIS — I10 ESSENTIAL HYPERTENSION: ICD-10-CM

## 2022-04-06 DIAGNOSIS — K21.00 GASTROESOPHAGEAL REFLUX DISEASE WITH ESOPHAGITIS WITHOUT HEMORRHAGE: Primary | ICD-10-CM

## 2022-04-06 RX ORDER — BUPROPION HYDROCHLORIDE 150 MG/1
150 TABLET, EXTENDED RELEASE ORAL 2 TIMES DAILY
Qty: 180 TABLET | Refills: 2 | Status: SHIPPED | OUTPATIENT
Start: 2022-04-06 | End: 2022-08-15

## 2022-04-06 RX ORDER — AMLODIPINE BESYLATE 10 MG/1
10 TABLET ORAL DAILY
Qty: 90 TABLET | Refills: 2 | Status: SHIPPED | OUTPATIENT
Start: 2022-04-06 | End: 2022-10-17 | Stop reason: SDUPTHER

## 2022-04-06 RX ORDER — OMEPRAZOLE 40 MG/1
40 CAPSULE, DELAYED RELEASE ORAL 2 TIMES DAILY
Qty: 180 CAPSULE | Refills: 3 | Status: SHIPPED | OUTPATIENT
Start: 2022-04-06 | End: 2022-10-17 | Stop reason: SDUPTHER

## 2022-04-12 ENCOUNTER — TELEPHONE (OUTPATIENT)
Dept: FAMILY MEDICINE CLINIC | Facility: CLINIC | Age: 47
End: 2022-04-12

## 2022-04-12 NOTE — TELEPHONE ENCOUNTER
LMOM to notify patient that unfortunately Dr Gusman' panel is full and he will be added to the wait list to see one of the new physicians coming to the practice in August and September.

## 2022-07-30 DIAGNOSIS — N52.9 DIFFICULTY ATTAINING ERECTION: ICD-10-CM

## 2022-08-02 RX ORDER — TADALAFIL 10 MG/1
TABLET ORAL
Qty: 90 TABLET | Refills: 3 | OUTPATIENT
Start: 2022-08-02

## 2022-08-15 ENCOUNTER — PATIENT ROUNDING (BHMG ONLY) (OUTPATIENT)
Dept: FAMILY MEDICINE CLINIC | Facility: CLINIC | Age: 47
End: 2022-08-15

## 2022-08-15 ENCOUNTER — OFFICE VISIT (OUTPATIENT)
Dept: FAMILY MEDICINE CLINIC | Facility: CLINIC | Age: 47
End: 2022-08-15

## 2022-08-15 VITALS
HEIGHT: 72 IN | HEART RATE: 91 BPM | RESPIRATION RATE: 18 BRPM | WEIGHT: 180 LBS | OXYGEN SATURATION: 97 % | TEMPERATURE: 96.7 F | BODY MASS INDEX: 24.38 KG/M2 | DIASTOLIC BLOOD PRESSURE: 90 MMHG | SYSTOLIC BLOOD PRESSURE: 140 MMHG

## 2022-08-15 DIAGNOSIS — N52.9 DIFFICULTY ATTAINING ERECTION: ICD-10-CM

## 2022-08-15 DIAGNOSIS — Z12.5 SCREENING FOR PROSTATE CANCER: ICD-10-CM

## 2022-08-15 DIAGNOSIS — I10 PRIMARY HYPERTENSION: Primary | ICD-10-CM

## 2022-08-15 DIAGNOSIS — H93.13 TINNITUS OF BOTH EARS: ICD-10-CM

## 2022-08-15 PROBLEM — H93.19 TINNITUS: Status: ACTIVE | Noted: 2022-08-15

## 2022-08-15 PROCEDURE — 99214 OFFICE O/P EST MOD 30 MIN: CPT | Performed by: FAMILY MEDICINE

## 2022-08-15 RX ORDER — TADALAFIL 10 MG/1
10 TABLET ORAL DAILY PRN
Qty: 90 TABLET | Refills: 3 | Status: SHIPPED | OUTPATIENT
Start: 2022-08-15 | End: 2022-08-15 | Stop reason: SDUPTHER

## 2022-08-15 RX ORDER — TADALAFIL 10 MG/1
10 TABLET ORAL DAILY PRN
Qty: 90 TABLET | Refills: 3 | Status: SHIPPED | OUTPATIENT
Start: 2022-08-15 | End: 2023-03-20 | Stop reason: SDUPTHER

## 2022-08-15 RX ORDER — LISINOPRIL 10 MG/1
10 TABLET ORAL DAILY
Qty: 90 TABLET | Refills: 2 | Status: SHIPPED | OUTPATIENT
Start: 2022-08-15 | End: 2022-10-17 | Stop reason: SDUPTHER

## 2022-08-15 RX ORDER — TADALAFIL 10 MG/1
10 TABLET ORAL DAILY PRN
Qty: 90 TABLET | Refills: 3 | Status: CANCELLED | OUTPATIENT
Start: 2022-08-15

## 2022-08-15 NOTE — ASSESSMENT & PLAN NOTE
Hypertension is worsening.  Dietary sodium restriction.  Weight loss.  Regular aerobic exercise.  Stop smoking.  Medication changes per orders.  Blood pressure will be reassessed in 3 months.

## 2022-08-15 NOTE — PROGRESS NOTES
A My-Chart message has been sent to the patient for PATIENT ROUNDING with [facility]

## 2022-08-15 NOTE — ASSESSMENT & PLAN NOTE
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  He was advised to get a hearing test at work and compare his last hearing test.  Patient was given the option to be seen by ENT but he declined.

## 2022-08-15 NOTE — PROGRESS NOTES
Chief Complaint  Chief Complaint   Patient presents with   • Establish Care     New Pt   • Tinnitus     Pt c/o of ringing in ears x 3 months       Subjective    History of Present Illness   {CC  Problem List  Visit Diagnosis   Encounters  Notes  Medications  Labs  Result Review Imaging  Media :23}     Pio Bazzi II presents to [facility] for   Hypertension  This is a chronic problem. The current episode started more than 1 year ago. The problem has been gradually worsening since onset. The problem is uncontrolled. Pertinent negatives include no chest pain, headaches, malaise/fatigue, neck pain, palpitations or shortness of breath. Agents associated with hypertension include NSAIDs. Risk factors for coronary artery disease include male gender and smoking/tobacco exposure. Past treatments include calcium channel blockers. Current antihypertension treatment includes calcium channel blockers. The current treatment provides mild improvement. There are no compliance problems.  There is no history of angina, CAD/MI, CVA or heart failure. There is no history of chronic renal disease, hypercortisolism, hyperparathyroidism, pheochromocytoma or a thyroid problem.   Tinnitus  This is a new problem. The current episode started more than 1 month ago. The problem occurs constantly. The problem has been gradually worsening. Pertinent negatives include no abdominal pain, anorexia, arthralgias, chest pain, fatigue, fever, headaches, neck pain, sore throat, urinary symptoms or vertigo. 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 He has tried nothing for the symptoms.   Erectile Dysfunction  This is a chronic problem. The current episode started more than 1 year ago. The problem is unchanged. The nature of his difficulty is maintaining erection. He reports no decreased libido. Irritative symptoms do not include nocturia or urgency. Obstructive symptoms do not include dribbling or an intermittent  "stream. Pertinent negatives include no dysuria, genital pain or hesitancy. Nothing aggravates the symptoms. Past treatments include tadalafil. The treatment provided significant relief. He has had no adverse reactions caused by medications. Risk factors include hypertension and tobacco use.        Objective   Vital Signs:   Visit Vitals  /90   Pulse 91   Temp 96.7 °F (35.9 °C)   Resp 18   Ht 182.9 cm (72\")   Wt 81.6 kg (180 lb)   SpO2 97%   BMI 24.41 kg/m²       Physical Exam  Vitals reviewed.   Constitutional:       Appearance: He is well-developed.   HENT:      Head: Normocephalic.      Right Ear: External ear normal.      Left Ear: External ear normal.      Nose: Nose normal.   Eyes:      Conjunctiva/sclera: Conjunctivae normal.   Cardiovascular:      Rate and Rhythm: Normal rate and regular rhythm.   Pulmonary:      Effort: Pulmonary effort is normal.      Breath sounds: Normal breath sounds.   Musculoskeletal:         General: Normal range of motion.      Cervical back: Normal range of motion and neck supple.   Skin:     General: Skin is warm and dry.      Capillary Refill: Capillary refill takes less than 2 seconds.   Neurological:      Mental Status: He is alert and oriented to person, place, and time.            Result Review :                    Assessment and Plan      Diagnoses and all orders for this visit:    1. Primary hypertension (Primary)  Assessment & Plan:  Hypertension is worsening.  Dietary sodium restriction.  Weight loss.  Regular aerobic exercise.  Stop smoking.  Medication changes per orders.  Blood pressure will be reassessed in 3 months.    Orders:  -     lisinopril (PRINIVIL,ZESTRIL) 10 MG tablet; Take 1 tablet by mouth Daily.  Dispense: 90 tablet; Refill: 2  -     CBC & Differential  -     Comprehensive Metabolic Panel  -     Lipid Panel With / Chol / HDL Ratio  -     TSH    2. Difficulty attaining erection  Assessment & Plan:  Patient was given a refill of tadalafil to treat his " symptoms.  No further treatment needed at this time.    Orders:  -     Discontinue: tadalafil (CIALIS) 10 MG tablet; Take 1 tablet by mouth Daily As Needed for Erectile Dysfunction.  Dispense: 90 tablet; Refill: 3  -     tadalafil (CIALIS) 10 MG tablet; Take 1 tablet by mouth Daily As Needed for Erectile Dysfunction.  Dispense: 90 tablet; Refill: 3    3. 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  He was advised to get a hearing test at work and compare his last hearing test.  Patient was given the option to be seen by ENT but he declined.      4. Screening for prostate cancer  -     PSA Screen           Follow Up   No follow-ups on file.  Patient was given instructions and counseling regarding his condition or for health maintenance advice. Please see specific information pulled into the AVS if appropriate.

## 2022-08-15 NOTE — TELEPHONE ENCOUNTER
Rx Refill Note  Requested Prescriptions     Pending Prescriptions Disp Refills   • tadalafil (CIALIS) 10 MG tablet 90 tablet 3     Sig: Take 1 tablet by mouth Daily As Needed for Erectile Dysfunction.      Last office visit with prescribing clinician: 1/21/2022      Next office visit with prescribing clinician: 8/15/2022            Tammy Hutton MA  08/15/22, 09:25 EDT

## 2022-08-15 NOTE — ASSESSMENT & PLAN NOTE
Patient was given a refill of tadalafil to treat his symptoms.  No further treatment needed at this time.

## 2022-08-16 LAB
ALBUMIN SERPL-MCNC: 4.8 G/DL (ref 4–5)
ALBUMIN/GLOB SERPL: 1.8 {RATIO} (ref 1.2–2.2)
ALP SERPL-CCNC: 79 IU/L (ref 44–121)
ALT SERPL-CCNC: 15 IU/L (ref 0–44)
AST SERPL-CCNC: 17 IU/L (ref 0–40)
BASOPHILS # BLD AUTO: 0.1 X10E3/UL (ref 0–0.2)
BASOPHILS NFR BLD AUTO: 0 %
BILIRUB SERPL-MCNC: 0.3 MG/DL (ref 0–1.2)
BUN SERPL-MCNC: 15 MG/DL (ref 6–24)
BUN/CREAT SERPL: 15 (ref 9–20)
CALCIUM SERPL-MCNC: 9.6 MG/DL (ref 8.7–10.2)
CHLORIDE SERPL-SCNC: 102 MMOL/L (ref 96–106)
CHOLEST SERPL-MCNC: 263 MG/DL (ref 100–199)
CHOLEST/HDLC SERPL: 3.7 RATIO (ref 0–5)
CO2 SERPL-SCNC: 27 MMOL/L (ref 20–29)
CREAT SERPL-MCNC: 1.02 MG/DL (ref 0.76–1.27)
EGFRCR-CYS SERPLBLD CKD-EPI 2021: 91 ML/MIN/1.73
EOSINOPHIL # BLD AUTO: 0.2 X10E3/UL (ref 0–0.4)
EOSINOPHIL NFR BLD AUTO: 2 %
ERYTHROCYTE [DISTWIDTH] IN BLOOD BY AUTOMATED COUNT: 12.6 % (ref 11.6–15.4)
GLOBULIN SER CALC-MCNC: 2.6 G/DL (ref 1.5–4.5)
GLUCOSE SERPL-MCNC: 86 MG/DL (ref 65–99)
HCT VFR BLD AUTO: 50.1 % (ref 37.5–51)
HDLC SERPL-MCNC: 72 MG/DL
HGB BLD-MCNC: 16.6 G/DL (ref 13–17.7)
IMM GRANULOCYTES # BLD AUTO: 0 X10E3/UL (ref 0–0.1)
IMM GRANULOCYTES NFR BLD AUTO: 0 %
LDLC SERPL CALC-MCNC: 143 MG/DL (ref 0–99)
LYMPHOCYTES # BLD AUTO: 1.7 X10E3/UL (ref 0.7–3.1)
LYMPHOCYTES NFR BLD AUTO: 14 %
MCH RBC QN AUTO: 30.2 PG (ref 26.6–33)
MCHC RBC AUTO-ENTMCNC: 33.1 G/DL (ref 31.5–35.7)
MCV RBC AUTO: 91 FL (ref 79–97)
MONOCYTES # BLD AUTO: 1.1 X10E3/UL (ref 0.1–0.9)
MONOCYTES NFR BLD AUTO: 8 %
NEUTROPHILS # BLD AUTO: 9.5 X10E3/UL (ref 1.4–7)
NEUTROPHILS NFR BLD AUTO: 76 %
PLATELET # BLD AUTO: 299 X10E3/UL (ref 150–450)
POTASSIUM SERPL-SCNC: 4.8 MMOL/L (ref 3.5–5.2)
PROT SERPL-MCNC: 7.4 G/DL (ref 6–8.5)
PSA SERPL-MCNC: 0.7 NG/ML (ref 0–4)
RBC # BLD AUTO: 5.49 X10E6/UL (ref 4.14–5.8)
SODIUM SERPL-SCNC: 140 MMOL/L (ref 134–144)
TRIGL SERPL-MCNC: 270 MG/DL (ref 0–149)
TSH SERPL DL<=0.005 MIU/L-ACNC: 1.24 UIU/ML (ref 0.45–4.5)
VLDLC SERPL CALC-MCNC: 48 MG/DL (ref 5–40)
WBC # BLD AUTO: 12.6 X10E3/UL (ref 3.4–10.8)

## 2022-08-17 ENCOUNTER — TELEPHONE (OUTPATIENT)
Dept: FAMILY MEDICINE CLINIC | Facility: CLINIC | Age: 47
End: 2022-08-17

## 2022-08-17 DIAGNOSIS — E78.2 MIXED HYPERLIPIDEMIA: Primary | ICD-10-CM

## 2022-08-17 RX ORDER — ATORVASTATIN CALCIUM 40 MG/1
40 TABLET, FILM COATED ORAL DAILY
Qty: 90 TABLET | Refills: 2 | Status: SHIPPED | OUTPATIENT
Start: 2022-08-17 | End: 2022-09-16 | Stop reason: SDUPTHER

## 2022-08-17 NOTE — TELEPHONE ENCOUNTER
----- Message from Alexey Box Sr., MD sent at 8/17/2022  9:42 AM EDT -----  Can you call this patient and let him know that his cholesterol was high and we need to start him on medicine to reduce his cholesterol level. Tell him he may need to monitor his diet and not have a high cholesterol meals.    Thanks

## 2022-09-16 ENCOUNTER — OFFICE VISIT (OUTPATIENT)
Dept: FAMILY MEDICINE CLINIC | Facility: CLINIC | Age: 47
End: 2022-09-16

## 2022-09-16 VITALS
RESPIRATION RATE: 17 BRPM | BODY MASS INDEX: 24.24 KG/M2 | HEART RATE: 92 BPM | DIASTOLIC BLOOD PRESSURE: 84 MMHG | SYSTOLIC BLOOD PRESSURE: 118 MMHG | OXYGEN SATURATION: 98 % | HEIGHT: 72 IN | TEMPERATURE: 96.8 F | WEIGHT: 179 LBS

## 2022-09-16 DIAGNOSIS — K42.9 UMBILICAL HERNIA WITHOUT OBSTRUCTION AND WITHOUT GANGRENE: ICD-10-CM

## 2022-09-16 DIAGNOSIS — E78.2 MIXED HYPERLIPIDEMIA: Primary | ICD-10-CM

## 2022-09-16 PROCEDURE — 99214 OFFICE O/P EST MOD 30 MIN: CPT | Performed by: FAMILY MEDICINE

## 2022-09-16 RX ORDER — ATORVASTATIN CALCIUM 40 MG/1
40 TABLET, FILM COATED ORAL DAILY
Qty: 90 TABLET | Refills: 3 | Status: SHIPPED | OUTPATIENT
Start: 2022-09-16 | End: 2023-01-02 | Stop reason: SDUPTHER

## 2022-09-16 NOTE — PROGRESS NOTES
"Chief Complaint  Chief Complaint   Patient presents with   • Labs Only     Pt here to discuss labs and treatment       Subjective    History of Present Illness   {CC  Problem List  Visit Diagnosis   Encounters  Notes  Medications  Labs  Result Review Imaging  Media :23}     Pio Bazzi II presents to [facility] for   History of Present Illness  Patient is a 47-year-old male that is being seen in the clinic for an umbilical hernia.  Patient states he has had this for over a year gradually worsened.  He states that its become more painful with activity.  He wanted to know if there was something he could do about the hernia.  Hyperlipidemia  This is a chronic problem. The current episode started more than 1 year ago. The problem is uncontrolled. Recent lipid tests were reviewed and are high. He has no history of diabetes, hypothyroidism or obesity. Pertinent negatives include no chest pain, leg pain, myalgias or shortness of breath. He is currently on no antihyperlipidemic treatment. Risk factors for coronary artery disease include male sex, stress and dyslipidemia.        Objective   Vital Signs:   Visit Vitals  /84   Pulse 92   Temp 96.8 °F (36 °C)   Resp 17   Ht 182.9 cm (72\")   Wt 81.2 kg (179 lb)   SpO2 98%   BMI 24.28 kg/m²       BMI is within normal parameters. No other follow-up for BMI required.     Physical Exam  Vitals reviewed.   Constitutional:       Appearance: He is well-developed.   HENT:      Head: Normocephalic.      Right Ear: External ear normal.      Left Ear: External ear normal.      Nose: Nose normal.   Eyes:      Conjunctiva/sclera: Conjunctivae normal.   Cardiovascular:      Rate and Rhythm: Normal rate and regular rhythm.   Pulmonary:      Effort: Pulmonary effort is normal.      Breath sounds: Normal breath sounds.   Musculoskeletal:         General: Normal range of motion.      Cervical back: Normal range of motion and neck supple.   Skin:   "   General: Skin is warm and dry.      Capillary Refill: Capillary refill takes less than 2 seconds.   Neurological:      Mental Status: He is alert and oriented to person, place, and time.            Result Review :                    Assessment and Plan   {CC Problem List  Visit Diagnosis  ROS  Review (Popup)  Health Maintenance  Quality  BestPractice  Medications  SmartSets  SnapShot Encounters  Media :23}   Diagnoses and all orders for this visit:    1. Mixed hyperlipidemia (Primary)  Assessment & Plan:  Lipid abnormalities are worsening.  Nutritional counseling was provided. and Pharmacotherapy as ordered.  Lipids will be reassessed in 3 months.    Orders:  -     atorvastatin (LIPITOR) 40 MG tablet; Take 1 tablet by mouth Daily.  Dispense: 90 tablet; Refill: 3    2. Umbilical hernia without obstruction and without gangrene  Assessment & Plan:  Worsening   we will refer patient to general surgery for further evaluation and treatment.             Follow Up   No follow-ups on file.  Patient was given instructions and counseling regarding his condition or for health maintenance advice. Please see specific information pulled into the AVS if appropriate.       Answers for HPI/ROS submitted by the patient on 9/16/2022  What is the primary reason for your visit?: Other  Please describe your symptoms.: My test results concerning high cholesterol and blood counts.  Have you had these symptoms before?: No  How long have you been having these symptoms?: Greater than 2 weeks

## 2022-09-19 PROBLEM — E78.2 MIXED HYPERLIPIDEMIA: Status: ACTIVE | Noted: 2022-09-19

## 2022-09-19 PROBLEM — K42.9 UMBILICAL HERNIA WITHOUT OBSTRUCTION AND WITHOUT GANGRENE: Status: ACTIVE | Noted: 2022-09-19

## 2022-10-17 DIAGNOSIS — I10 ESSENTIAL HYPERTENSION: ICD-10-CM

## 2022-10-17 DIAGNOSIS — I10 PRIMARY HYPERTENSION: ICD-10-CM

## 2022-10-17 DIAGNOSIS — K21.00 GASTROESOPHAGEAL REFLUX DISEASE WITH ESOPHAGITIS WITHOUT HEMORRHAGE: ICD-10-CM

## 2022-10-17 RX ORDER — LISINOPRIL 10 MG/1
10 TABLET ORAL DAILY
Qty: 90 TABLET | Refills: 2 | Status: SHIPPED | OUTPATIENT
Start: 2022-10-17

## 2022-10-17 RX ORDER — AMLODIPINE BESYLATE 10 MG/1
10 TABLET ORAL DAILY
Qty: 90 TABLET | Refills: 2 | Status: SHIPPED | OUTPATIENT
Start: 2022-10-17 | End: 2022-12-05 | Stop reason: SDUPTHER

## 2022-10-17 RX ORDER — OMEPRAZOLE 40 MG/1
40 CAPSULE, DELAYED RELEASE ORAL 2 TIMES DAILY
Qty: 180 CAPSULE | Refills: 3 | Status: SHIPPED | OUTPATIENT
Start: 2022-10-17 | End: 2023-03-20 | Stop reason: SDUPTHER

## 2022-10-18 ENCOUNTER — TELEPHONE (OUTPATIENT)
Dept: FAMILY MEDICINE CLINIC | Facility: CLINIC | Age: 47
End: 2022-10-18

## 2022-10-18 NOTE — TELEPHONE ENCOUNTER
"If patients call back to schedule an appointment please sched him as a same day per his previous message     \"For the last month my tailbone has been hurting (bruised like feeling) when I squeeze that muscle it hurts near my bottom.     I have also been pooping blood, at least 3 xs. In the past month.:\"       HUB OKAY TO READ AND SCHEDULE  "

## 2022-11-03 ENCOUNTER — OFFICE VISIT (OUTPATIENT)
Dept: FAMILY MEDICINE CLINIC | Facility: CLINIC | Age: 47
End: 2022-11-03

## 2022-11-03 VITALS
TEMPERATURE: 97.3 F | SYSTOLIC BLOOD PRESSURE: 130 MMHG | HEART RATE: 82 BPM | HEIGHT: 72 IN | DIASTOLIC BLOOD PRESSURE: 80 MMHG | RESPIRATION RATE: 17 BRPM | BODY MASS INDEX: 25.19 KG/M2 | OXYGEN SATURATION: 97 % | WEIGHT: 186 LBS

## 2022-11-03 DIAGNOSIS — M53.3 COCCYDYNIA: ICD-10-CM

## 2022-11-03 DIAGNOSIS — K62.5 RECTAL BLEEDING: Primary | ICD-10-CM

## 2022-11-03 DIAGNOSIS — Z12.11 ENCOUNTER FOR SCREENING COLONOSCOPY: ICD-10-CM

## 2022-11-03 PROCEDURE — 99214 OFFICE O/P EST MOD 30 MIN: CPT | Performed by: FAMILY MEDICINE

## 2022-11-03 NOTE — PROGRESS NOTES
"Chief Complaint  Chief Complaint   Patient presents with   • Pain     Pt c/o pain on tailbone x 2 months    • Rectal Bleeding     Pt c/o bleeding when wiping on and off x 2 months      Pio Bazzi is a 47-year-old male that presents at today's visit for coccydynia and hematochezia.    Subjective    History of Present Illness        Pio Bazzi II presents to [facility] for   History of Present Illness  Coccydynia  Mr. Bazzi states that he experiences pain on the tip of his coccyx. He shares that while sitting does not cause him pain, but squeezing his buttocks does. He denies ever falling. He denies being able to recall what caused the initial coccydynia. He confirms waking up 1 day with coccydynia. The patients states that his current pain level is between a 6 or 7 when he squeezes. When he is not squeezing he does not experience any pain. He denies experiencing pain further downwards than the coccyx. The patient denies taking ibuprofen for pain relief.     Hematochezia  Mr. Bazzi complains of hematochezia while using the restroom. He states that the blood is bright red when he wipes, but does not fill the toilet bowl. This issue has been going on for a month to month and a half. The patient denies experiencing hematochezia during every bowel movement. He report that the hematochezia has only occurred 2 or 3 times, but that it is noticeable when it occurs.  Mr. Bazzi denies experiencing hard or large stools or straining during bowel movements.          Objective   Vital Signs:   Visit Vitals  /80   Pulse 82   Temp 97.3 °F (36.3 °C)   Resp 17   Ht 182.9 cm (72\")   Wt 84.4 kg (186 lb)   SpO2 97%   BMI 25.23 kg/m²       BMI is >= 25 and <30. (Overweight) The following options were offered after discussion;: weight loss educational material (shared in after visit summary)     Physical Exam  Vitals reviewed.   Constitutional:       Appearance: He is well-developed.   HENT:      " Head: Normocephalic.      Right Ear: External ear normal.      Left Ear: External ear normal.      Nose: Nose normal.   Eyes:      Conjunctiva/sclera: Conjunctivae normal.   Cardiovascular:      Rate and Rhythm: Normal rate and regular rhythm.   Pulmonary:      Effort: Pulmonary effort is normal.      Breath sounds: Normal breath sounds.   Musculoskeletal:         General: Normal range of motion.      Cervical back: Normal range of motion and neck supple.   Skin:     General: Skin is warm and dry.      Capillary Refill: Capillary refill takes less than 2 seconds.   Neurological:      Mental Status: He is alert and oriented to person, place, and time.              Result Review :                      Assessment and Plan      Diagnoses and all orders for this visit:    1. Rectal bleeding (Primary)  Assessment & Plan:  -The patient will be referred to get colonoscopy screening.   -The patient is advised to not strain during his bowel movements.   -The patient is advised to increase fiber intake like nuts, vegetables, and fiber supplements.      2. Coccydynia  Assessment & Plan:  -The patient is advised to try ibuprofen for pain and discomfort.  -The patient is advised to apply heat to the area of discomfort.       3. Encounter for screening colonoscopy  -     Ambulatory Referral For Screening Colonoscopy           Follow Up   No follow-ups on file.  Patient was given instructions and counseling regarding his condition or for health maintenance advice. Please see specific information pulled into the AVS if appropriate.       Answers for HPI/ROS submitted by the patient on 11/3/2022  What is the primary reason for your visit?: Other  Please describe your symptoms.: Pain in tailbone and bleeding  Have you had these symptoms before?: No  How long have you been having these symptoms?: Greater than 2 weeks      Transcribed from ambient dictation for Alexey Box Sr, MD by Michaelle Puente.  11/03/22   15:01  EDT    Patient or patient representative verbalized consent to the visit recording.  I have personally performed the services described in this document as transcribed by the above individual, and it is both accurate and complete.

## 2022-11-06 PROBLEM — M53.3 COCCYDYNIA: Status: ACTIVE | Noted: 2022-11-06

## 2022-11-06 PROBLEM — K62.5 RECTAL BLEEDING: Status: ACTIVE | Noted: 2022-11-06

## 2022-11-06 NOTE — ASSESSMENT & PLAN NOTE
-The patient is advised to try ibuprofen for pain and discomfort.  -The patient is advised to apply heat to the area of discomfort.

## 2022-11-06 NOTE — ASSESSMENT & PLAN NOTE
-The patient will be referred to get colonoscopy screening.   -The patient is advised to not strain during his bowel movements.   -The patient is advised to increase fiber intake like nuts, vegetables, and fiber supplements.

## 2022-12-05 DIAGNOSIS — I10 ESSENTIAL HYPERTENSION: ICD-10-CM

## 2022-12-05 RX ORDER — AMLODIPINE BESYLATE 10 MG/1
10 TABLET ORAL DAILY
Qty: 90 TABLET | Refills: 2 | Status: SHIPPED | OUTPATIENT
Start: 2022-12-05 | End: 2023-01-02 | Stop reason: SDUPTHER

## 2022-12-05 NOTE — TELEPHONE ENCOUNTER
Rx Refill Note  Requested Prescriptions     Pending Prescriptions Disp Refills   • amLODIPine (NORVASC) 10 MG tablet 90 tablet 2     Sig: Take 1 tablet by mouth Daily.      Last office visit with prescribing clinician: 11/3/2022   Last telemedicine visit with prescribing clinician: Visit date not found   Next office visit with prescribing clinician: Visit date not found   {

## 2023-01-02 DIAGNOSIS — I10 ESSENTIAL HYPERTENSION: ICD-10-CM

## 2023-01-02 DIAGNOSIS — E78.2 MIXED HYPERLIPIDEMIA: ICD-10-CM

## 2023-01-02 RX ORDER — AMLODIPINE BESYLATE 10 MG/1
10 TABLET ORAL DAILY
Qty: 90 TABLET | Refills: 2 | Status: SHIPPED | OUTPATIENT
Start: 2023-01-02 | End: 2023-03-20 | Stop reason: SDUPTHER

## 2023-01-02 RX ORDER — ATORVASTATIN CALCIUM 40 MG/1
40 TABLET, FILM COATED ORAL DAILY
Qty: 90 TABLET | Refills: 3 | Status: SHIPPED | OUTPATIENT
Start: 2023-01-02 | End: 2023-03-20 | Stop reason: SDUPTHER

## 2023-02-20 ENCOUNTER — TELEPHONE (OUTPATIENT)
Dept: GASTROENTEROLOGY | Facility: CLINIC | Age: 48
End: 2023-02-20
Payer: COMMERCIAL

## 2023-02-20 NOTE — TELEPHONE ENCOUNTER
NEW PT     Schedule CLS with Dr. Grande referral in chart. If pt doesn't answer please call spouse at [phone] Ms. Golden.

## 2023-03-06 ENCOUNTER — PREP FOR SURGERY (OUTPATIENT)
Dept: SURGERY | Facility: SURGERY CENTER | Age: 48
End: 2023-03-06
Payer: COMMERCIAL

## 2023-03-06 DIAGNOSIS — Z12.11 ENCOUNTER FOR SCREENING FOR MALIGNANT NEOPLASM OF COLON: Primary | ICD-10-CM

## 2023-03-06 RX ORDER — SODIUM CHLORIDE, SODIUM LACTATE, POTASSIUM CHLORIDE, CALCIUM CHLORIDE 600; 310; 30; 20 MG/100ML; MG/100ML; MG/100ML; MG/100ML
30 INJECTION, SOLUTION INTRAVENOUS CONTINUOUS PRN
OUTPATIENT
Start: 2023-03-06

## 2023-03-20 DIAGNOSIS — K21.00 GASTROESOPHAGEAL REFLUX DISEASE WITH ESOPHAGITIS WITHOUT HEMORRHAGE: ICD-10-CM

## 2023-03-20 DIAGNOSIS — E78.2 MIXED HYPERLIPIDEMIA: ICD-10-CM

## 2023-03-20 DIAGNOSIS — N52.9 DIFFICULTY ATTAINING ERECTION: ICD-10-CM

## 2023-03-20 DIAGNOSIS — I10 ESSENTIAL HYPERTENSION: ICD-10-CM

## 2023-03-21 RX ORDER — ATORVASTATIN CALCIUM 40 MG/1
40 TABLET, FILM COATED ORAL DAILY
Qty: 90 TABLET | Refills: 3 | Status: SHIPPED | OUTPATIENT
Start: 2023-03-21

## 2023-03-21 RX ORDER — TADALAFIL 10 MG/1
10 TABLET ORAL DAILY PRN
Qty: 90 TABLET | Refills: 3 | Status: SHIPPED | OUTPATIENT
Start: 2023-03-21

## 2023-03-21 RX ORDER — AMLODIPINE BESYLATE 10 MG/1
10 TABLET ORAL DAILY
Qty: 90 TABLET | Refills: 2 | Status: SHIPPED | OUTPATIENT
Start: 2023-03-21

## 2023-03-21 RX ORDER — OMEPRAZOLE 40 MG/1
40 CAPSULE, DELAYED RELEASE ORAL 2 TIMES DAILY
Qty: 180 CAPSULE | Refills: 3 | Status: SHIPPED | OUTPATIENT
Start: 2023-03-21

## 2023-03-21 NOTE — TELEPHONE ENCOUNTER
Rx Refill Note  Requested Prescriptions     Pending Prescriptions Disp Refills   • tadalafil (CIALIS) 10 MG tablet 90 tablet 3     Sig: Take 1 tablet by mouth Daily As Needed for Erectile Dysfunction.   • omeprazole (priLOSEC) 40 MG capsule 180 capsule 3     Sig: Take 1 capsule by mouth 2 (Two) Times a Day.   • atorvastatin (LIPITOR) 40 MG tablet 90 tablet 3     Sig: Take 1 tablet by mouth Daily.   • amLODIPine (NORVASC) 10 MG tablet 90 tablet 2     Sig: Take 1 tablet by mouth Daily.      Last office visit with prescribing clinician: 11/3/2022   Last telemedicine visit with prescribing clinician: Visit date not found   Next office visit with prescribing clinician: Visit date not found                         Would you like a call back once the refill request has been completed: [] Yes [] No    If the office needs to give you a call back, can they leave a voicemail: [] Yes [] No    Tammy Hutton MA  03/21/23, 09:28 EDT

## 2023-03-29 PROBLEM — Z12.11 ENCOUNTER FOR SCREENING FOR MALIGNANT NEOPLASM OF COLON: Status: ACTIVE | Noted: 2023-03-29

## 2023-04-19 ENCOUNTER — ANESTHESIA EVENT (OUTPATIENT)
Dept: SURGERY | Facility: SURGERY CENTER | Age: 48
End: 2023-04-19
Payer: COMMERCIAL

## 2023-04-19 ENCOUNTER — ANESTHESIA (OUTPATIENT)
Dept: SURGERY | Facility: SURGERY CENTER | Age: 48
End: 2023-04-19
Payer: COMMERCIAL

## 2023-04-19 ENCOUNTER — HOSPITAL ENCOUNTER (OUTPATIENT)
Facility: SURGERY CENTER | Age: 48
Setting detail: HOSPITAL OUTPATIENT SURGERY
Discharge: HOME | End: 2023-04-19
Attending: INTERNAL MEDICINE | Admitting: INTERNAL MEDICINE
Payer: COMMERCIAL

## 2023-04-19 VITALS
RESPIRATION RATE: 16 BRPM | HEIGHT: 72 IN | BODY MASS INDEX: 24.03 KG/M2 | OXYGEN SATURATION: 96 % | HEART RATE: 85 BPM | SYSTOLIC BLOOD PRESSURE: 120 MMHG | DIASTOLIC BLOOD PRESSURE: 86 MMHG | WEIGHT: 177.4 LBS | TEMPERATURE: 97.5 F

## 2023-04-19 DIAGNOSIS — Z12.11 ENCOUNTER FOR SCREENING FOR MALIGNANT NEOPLASM OF COLON: ICD-10-CM

## 2023-04-19 PROCEDURE — 45385 COLONOSCOPY W/LESION REMOVAL: CPT | Performed by: INTERNAL MEDICINE

## 2023-04-19 PROCEDURE — 25010000002 PROPOFOL 10 MG/ML EMULSION: Performed by: ANESTHESIOLOGY

## 2023-04-19 PROCEDURE — 88305 TISSUE EXAM BY PATHOLOGIST: CPT | Performed by: INTERNAL MEDICINE

## 2023-04-19 PROCEDURE — 45380 COLONOSCOPY AND BIOPSY: CPT | Performed by: INTERNAL MEDICINE

## 2023-04-19 RX ORDER — SODIUM CHLORIDE, SODIUM LACTATE, POTASSIUM CHLORIDE, CALCIUM CHLORIDE 600; 310; 30; 20 MG/100ML; MG/100ML; MG/100ML; MG/100ML
30 INJECTION, SOLUTION INTRAVENOUS CONTINUOUS PRN
Status: DISCONTINUED | OUTPATIENT
Start: 2023-04-19 | End: 2023-04-19 | Stop reason: HOSPADM

## 2023-04-19 RX ORDER — LIDOCAINE HYDROCHLORIDE 20 MG/ML
INJECTION, SOLUTION INFILTRATION; PERINEURAL AS NEEDED
Status: DISCONTINUED | OUTPATIENT
Start: 2023-04-19 | End: 2023-04-19 | Stop reason: SURG

## 2023-04-19 RX ORDER — PROPOFOL 10 MG/ML
VIAL (ML) INTRAVENOUS AS NEEDED
Status: DISCONTINUED | OUTPATIENT
Start: 2023-04-19 | End: 2023-04-19 | Stop reason: SURG

## 2023-04-19 RX ADMIN — PROPOFOL INJECTABLE EMULSION 200 MCG/KG/MIN: 10 INJECTION, EMULSION INTRAVENOUS at 14:29

## 2023-04-19 RX ADMIN — SODIUM CHLORIDE, SODIUM LACTATE, POTASSIUM CHLORIDE, AND CALCIUM CHLORIDE: .6; .31; .03; .02 INJECTION, SOLUTION INTRAVENOUS at 14:26

## 2023-04-19 RX ADMIN — LIDOCAINE HYDROCHLORIDE 30 MG: 20 INJECTION, SOLUTION INFILTRATION; PERINEURAL at 14:29

## 2023-04-19 RX ADMIN — PROPOFOL 200 MG: 10 INJECTION, EMULSION INTRAVENOUS at 14:29

## 2023-04-19 NOTE — ANESTHESIA POSTPROCEDURE EVALUATION
Patient: Pio Bazzi II    Procedure Summary     Date: 04/19/23 Room / Location: [facility]    Anesthesia Start: 1425 Anesthesia Stop: 1446    Procedure: COLONOSCOPY FOR SCREENING with polypectomy Diagnosis:       Encounter for screening for malignant neoplasm of colon      (Encounter for screening for malignant neoplasm of colon [Z12.11])    Surgeons: Eduardo Grande MD Provider: Braeden Lott MD    Anesthesia Type: MAC ASA Status: 2          Anesthesia Type: MAC    Vitals  Vitals Value Taken Time   BP 99/70 04/19/23 1455   Temp 36.4 °C (97.5 °F) 04/19/23 1445   Pulse 63 04/19/23 1455   Resp 14 04/19/23 1455   SpO2 93 % 04/19/23 1455           Post Anesthesia Care and Evaluation    Patient location during evaluation: PHASE II  Patient participation: complete - patient participated  Level of consciousness: awake  Pain management: satisfactory to patient    Airway patency: patent  Anesthetic complications: No anesthetic complications  PONV Status: controlled  Cardiovascular status: acceptable  Respiratory status: acceptable  Hydration status: acceptable

## 2023-04-19 NOTE — ANESTHESIA PREPROCEDURE EVALUATION
Anesthesia Evaluation     Patient summary reviewed and Nursing notes reviewed   NPO Solid Status: > 6 hours  NPO Liquid Status: > 6 hours           Airway   Mallampati: II  TM distance: >3 FB  Neck ROM: full  Dental - normal exam     Pulmonary    (+) a smoker Current,   (-) COPD, asthma, sleep apnea  Cardiovascular     (+) hypertension, hyperlipidemia,   (-) CAD, dysrhythmias, angina      Neuro/Psych  (-) seizures, CVA  GI/Hepatic/Renal/Endo    (-) GERD, diabetes    Musculoskeletal     Abdominal    Substance History      OB/GYN          Other                        Anesthesia Plan    ASA 2     MAC       Anesthetic plan, risks, benefits, and alternatives have been provided, discussed and informed consent has been obtained with: patient.        CODE STATUS:

## 2023-04-19 NOTE — H&P
No chief complaint on file.      HPI  Patient here today for screening colonoscopy.         Problem List:    Patient Active Problem List   Diagnosis   • Medial epicondylitis of right elbow   • Urinary urgency   • Difficulty attaining erection   • Other chest pain   • Tobacco use disorder   • Psoriasis   • Hypertension   • Acute midline low back pain without sciatica   • Prepatellar bursitis   • Generalized abdominal pain   • Rib pain on left side   • Tinnitus   • Mixed hyperlipidemia   • Umbilical hernia without obstruction and without gangrene   • Rectal bleeding   • Coccydynia   • Encounter for screening for malignant neoplasm of colon       Medical History:    Past Medical History:   Diagnosis Date   • Acute midline low back pain without sciatica     Impression: He was given Toradol 60mg IM inj and Depo/Decadron IM inj. He was started on Prednisone, Ibuprofen, and Zanaflex.   • Chicken pox    • Hernia, abdominal    • Hypertension    • Psoriasis    • Shingles     Impression: He will be started on Acyclovir, Prednisone and Gabapentin to treat his pain. He was encouraged to RTC if his symptoms did not improve.   • Tobacco use disorder         Social History:    Social History     Socioeconomic History   • Marital status:    Tobacco Use   • Smoking status: Every Day     Packs/day: 1.00     Years: 15.00     Pack years: 15.00     Types: Cigarettes   Substance and Sexual Activity   • Alcohol use: Yes     Comment: Occasional   • Drug use: No   • Sexual activity: Yes     Partners: Female       Family History:   Family History   Problem Relation Age of Onset   • Coronary artery disease Mother    • Hypertension Mother    • Coronary artery disease Father    • Hypertension Father        Surgical History: No past surgical history on file.    No current facility-administered medications for this encounter.    Allergies:   Allergies   Allergen Reactions   • Bee Venom Anaphylaxis     anaphylactic reaction to a bee sting    • Pollen Extract Unknown - Low Severity        The following portions of the patient's history were reviewed by me and updated as appropriate: review of systems, allergies, current medications, past family history, past medical history, past social history, past surgical history and problem list.    There were no vitals filed for this visit.    PHYSICAL EXAM:    CONSTITUTIONAL:  today's vital signs reviewed by me  GASTROINTESTINAL: abdomen is soft nontender nondistended with normal active bowel sounds, no masses are appreciated    Assessment/ Plan  We will proceed today with screening colonoscopy.    Risks and benefits as well as alternatives to endoscopic evaluation were explained to the patient and they voiced understanding and wish to proceed.  These risks include but are not limited to the risk of bleeding, perforation, adverse reaction to sedation, and missed lesions.  The patient was given the opportunity to ask questions prior to the endoscopic procedure.

## 2023-04-19 NOTE — DISCHARGE INSTRUCTIONS
ENDOSCOPY - EGD/COLONOSCOPY       ADULT CARE DISCHARGE  INSTRUCTIONS     Symptoms you may temporarily experience:      Sore Throat     Hoarseness     Bloating/Cramping     Dizziness     IV Irritation/tenderness     Gas or Belching     Slight fever     Small amount of blood in vomit or stool       Call Your Doctor for the following Problems: [phone]    Fever of 101 degrees or higher       Sharp abdominal  pain     Red streak up the arm from the IV site     Severe cramping        Large amount of blood in stool or vomit       Instructions for the next 24 hours after your Procedure:     Adult supervision     Do NOT drink any alcohol      Do not work today     NO important decisions     DO NOT sign any legal documents     You may shower/ bathe       DO NOT  DRIVE or operate machinery     Resume normal activity tomorrow       Discharge  Diet:     Avoid spicy/ greasy foods     Avoid any food that will cause more gas or bloating       *** Seek IMMEDIATE medical attention and call 911 if you develop symptoms such as:     Chest pain     Shortness of breath     Severe bleeding

## 2023-04-21 LAB
LAB AP CASE REPORT: NORMAL
LAB AP CLINICAL INFORMATION: NORMAL
LAB AP DIAGNOSIS COMMENT: NORMAL
PATH REPORT.FINAL DX SPEC: NORMAL
PATH REPORT.GROSS SPEC: NORMAL

## 2023-05-01 ENCOUNTER — TELEPHONE (OUTPATIENT)
Dept: FAMILY MEDICINE CLINIC | Facility: CLINIC | Age: 48
End: 2023-05-01

## 2023-05-01 NOTE — TELEPHONE ENCOUNTER
Hub staff attempted to follow warm transfer process and was unsuccessful     Caller: ALEXYS MARTINEZ    Relationship to patient: Emergency Contact    Best call back number: [phone]    Patient is needing: A CALL BACK/ APPOINTMENT.     Chief complaint: HOSPITAL FOLLOW UP     Type of visit: HOSPITAL FOLLOW UP     Requested date: 5/3/23, 5/15/23, 5/25/23, OR 5/26/23     If rescheduling, when is the original appointment: 5/1/23     Additional notes: PATIENT GOT CALLED IN TO WORK AND IS NEEDING TO RESCHEDULE HOSPITAL FOLLOW UP.

## 2023-05-02 NOTE — TELEPHONE ENCOUNTER
Called to schedule hospital fu. No answer LVM for pt to call back to be scheduled     Hub to read

## 2023-05-10 ENCOUNTER — OFFICE VISIT (OUTPATIENT)
Dept: FAMILY MEDICINE CLINIC | Facility: CLINIC | Age: 48
End: 2023-05-10
Payer: COMMERCIAL

## 2023-05-10 VITALS
HEART RATE: 78 BPM | WEIGHT: 184 LBS | BODY MASS INDEX: 24.92 KG/M2 | OXYGEN SATURATION: 96 % | DIASTOLIC BLOOD PRESSURE: 88 MMHG | HEIGHT: 72 IN | RESPIRATION RATE: 17 BRPM | TEMPERATURE: 97.8 F | SYSTOLIC BLOOD PRESSURE: 130 MMHG

## 2023-05-10 DIAGNOSIS — K62.5 RECTAL BLEEDING: ICD-10-CM

## 2023-05-10 DIAGNOSIS — K21.9 GASTROESOPHAGEAL REFLUX DISEASE WITHOUT ESOPHAGITIS: ICD-10-CM

## 2023-05-10 DIAGNOSIS — I10 PRIMARY HYPERTENSION: Primary | ICD-10-CM

## 2023-05-10 NOTE — ASSESSMENT & PLAN NOTE
After the colonoscopy this is likely due to his internal hemorrhoids.  No treatment needed at this time.  He will need to get a repeat colonoscopy in 5 years.

## 2023-05-10 NOTE — PROGRESS NOTES
"Chief Complaint  Chief Complaint   Patient presents with   • GI Problem     Pt had coloscopy and polyps were discovered . Recommends that pt gets a endoscopy        Subjective    History of Present Illness        Pio Bazzi II presents to [facility] for   GI Problem  Primary symptoms do not include fever, weight loss, fatigue, abdominal pain, nausea or jaundice. The problem has not changed since onset.  The illness does not include chills, anorexia, dysphagia, odynophagia, bloating, constipation or itching. Significant associated medical issues include GERD and hemorrhoids (non bleeding internal). Associated medical issues do not include diverticulitis.   Hypertension  This is a chronic problem. The current episode started more than 1 year ago. The problem is unchanged. The problem is controlled. Pertinent negatives include no blurred vision, chest pain, malaise/fatigue, neck pain, orthopnea, palpitations, PND, shortness of breath or sweats. Risk factors for coronary artery disease include male gender. Past treatments include ACE inhibitors. Current antihypertension treatment includes angiotensin blockers. The current treatment provides moderate improvement. Compliance problems include medication side effects (lisinopril caused him to have a cough).  There is no history of coarctation of the aorta, hyperaldosteronism, hypercortisolism, hyperparathyroidism or pheochromocytoma.        Objective   Vital Signs:   Visit Vitals  /88   Pulse 78   Temp 97.8 °F (36.6 °C)   Resp 17   Ht 182.9 cm (72\")   Wt 83.5 kg (184 lb)   SpO2 96%   BMI 24.95 kg/m²       BMI is within normal parameters. No other follow-up for BMI required.     Physical Exam  Vitals reviewed.   Constitutional:       Appearance: He is well-developed.   HENT:      Head: Normocephalic.      Right Ear: External ear normal.      Left Ear: External ear normal.      Nose: Nose normal.   Eyes:      Conjunctiva/sclera: " Conjunctivae normal.   Cardiovascular:      Rate and Rhythm: Normal rate and regular rhythm.   Pulmonary:      Effort: Pulmonary effort is normal.      Breath sounds: Normal breath sounds.   Musculoskeletal:         General: Normal range of motion.      Cervical back: Normal range of motion and neck supple.   Skin:     General: Skin is warm and dry.      Capillary Refill: Capillary refill takes less than 2 seconds.   Neurological:      Mental Status: He is alert and oriented to person, place, and time.            Result Review :                    Assessment and Plan      Diagnoses and all orders for this visit:    1. Primary hypertension (Primary)  Assessment & Plan:  Hypertension is unchanged.  Continue current treatment regimen.  Dietary sodium restriction.  Weight loss.  Regular aerobic exercise.  Continue current medications.  Blood pressure will be reassessed at the next regular appointment.      2. Gastroesophageal reflux disease without esophagitis  Assessment & Plan:  Patient is currently on omeprazole and is stable.  No change in treatment at this time.      3. Rectal bleeding  Assessment & Plan:  After the colonoscopy this is likely due to his internal hemorrhoids.  No treatment needed at this time.  He will need to get a repeat colonoscopy in 5 years.             Follow Up   No follow-ups on file.  Patient was given instructions and counseling regarding his condition or for health maintenance advice. Please see specific information pulled into the AVS if appropriate.

## 2023-06-07 DIAGNOSIS — I10 ESSENTIAL HYPERTENSION: ICD-10-CM

## 2023-06-07 DIAGNOSIS — E78.2 MIXED HYPERLIPIDEMIA: ICD-10-CM

## 2023-06-08 RX ORDER — AMLODIPINE BESYLATE 10 MG/1
10 TABLET ORAL DAILY
Qty: 90 TABLET | Refills: 2 | Status: SHIPPED | OUTPATIENT
Start: 2023-06-08

## 2023-06-08 RX ORDER — ATORVASTATIN CALCIUM 40 MG/1
40 TABLET, FILM COATED ORAL DAILY
Qty: 90 TABLET | Refills: 3 | Status: SHIPPED | OUTPATIENT
Start: 2023-06-08

## 2023-07-25 ENCOUNTER — OFFICE VISIT (OUTPATIENT)
Dept: GASTROENTEROLOGY | Facility: CLINIC | Age: 48
End: 2023-07-25
Payer: COMMERCIAL

## 2023-07-25 VITALS
HEART RATE: 90 BPM | OXYGEN SATURATION: 97 % | TEMPERATURE: 98 F | HEIGHT: 72 IN | SYSTOLIC BLOOD PRESSURE: 148 MMHG | DIASTOLIC BLOOD PRESSURE: 86 MMHG | WEIGHT: 183.5 LBS | BODY MASS INDEX: 24.85 KG/M2

## 2023-07-25 DIAGNOSIS — K42.9 UMBILICAL HERNIA WITHOUT OBSTRUCTION AND WITHOUT GANGRENE: Primary | ICD-10-CM

## 2023-07-25 PROCEDURE — 99214 OFFICE O/P EST MOD 30 MIN: CPT | Performed by: NURSE PRACTITIONER

## 2023-07-25 NOTE — PROGRESS NOTES
"Chief Complaint   Patient presents with    Abdominal Pain         History of Present Illness  Patient is a 47-year-old male who presents today for  Follow-up.  He had a colonoscopy for screening purposes April 2023.  He had diverticulosis, internal hemorrhoids, and 2 polyps on his exam.    Patient presents today with concerns about abdominal pain.  Reports history of an umbilical hernia.  Over the last 2 months he has noted that the hernia has become more prominent and has began having pain to the area.  Reports he feels a knot to his periumbilical area.  He has had some bloating.  Touching the area is sensitive and he feels discomfort if the area is bumped.    He denies any nausea or vomiting.  Bowels have been moving regularly.  Has a history of GERD, currently well controlled with omeprazole.     Result Review :       Tissue Pathology Exam (04/19/2023 14:33)    COLONOSCOPY (04/19/2023 14:21)    Vital Signs:   /86   Pulse 90   Temp 98 °F (36.7 °C)   Ht 182.9 cm (72\")   Wt 83.2 kg (183 lb 8 oz)   SpO2 97%   BMI 24.89 kg/m²     Body mass index is 24.89 kg/m².     Physical Exam  Vitals reviewed.   Constitutional:       General: He is not in acute distress.     Appearance: He is well-developed.   HENT:      Head: Normocephalic and atraumatic.   Pulmonary:      Effort: Pulmonary effort is normal. No respiratory distress.   Abdominal:      General: Abdomen is flat. Bowel sounds are normal.      Palpations: Abdomen is soft.      Tenderness: There is abdominal tenderness in the periumbilical area. There is guarding.      Hernia: A hernia is present. Hernia is present in the umbilical area.      Comments: Umbilical hernia present, unable to reduce in the office, tenderness present to hernia.   Skin:     General: Skin is dry.      Coloration: Skin is not pale.   Neurological:      Mental Status: He is alert and oriented to person, place, and time.   Psychiatric:         Thought Content: Thought content normal. "         Assessment and Plan    Diagnoses and all orders for this visit:    1. Umbilical hernia without obstruction and without gangrene (Primary)  -     Ambulatory Referral to General Surgery         Discussion  Patient presents today for evaluation with concerns about abdominal pain.  On exam in the office he has an umbilical hernia, unable to reduce hernia in the office due to significant tenderness with palpation.  Hernia felt to be source of symptoms and recommended surgical consultation to discuss repair.          Follow Up   Return if symptoms worsen or fail to improve.    Patient Instructions   Refer to general surgery for consultation to discuss repair of umbilical hernia.

## 2023-08-31 ENCOUNTER — OFFICE VISIT (OUTPATIENT)
Dept: SURGERY | Facility: CLINIC | Age: 48
End: 2023-08-31
Payer: COMMERCIAL

## 2023-08-31 VITALS
DIASTOLIC BLOOD PRESSURE: 82 MMHG | BODY MASS INDEX: 25.76 KG/M2 | HEIGHT: 71 IN | SYSTOLIC BLOOD PRESSURE: 125 MMHG | WEIGHT: 184 LBS

## 2023-08-31 DIAGNOSIS — K42.9 UMBILICAL HERNIA WITHOUT OBSTRUCTION AND WITHOUT GANGRENE: Primary | ICD-10-CM

## 2023-08-31 PROCEDURE — 99203 OFFICE O/P NEW LOW 30 MIN: CPT

## 2023-08-31 NOTE — PROGRESS NOTES
ASSESSMENT/PLAN:  48 year old male with a symptomatic, small to moderate, reducible umbilical hernia. Discussed the option to continue with observation.  Reviewed the signs and symptoms of incarceration and strangulation.  Discussed the option to proceed with surgical intervention.  Discussed this would entail an open umbilical hernia repair with possible mesh placement. Reviewed the nature of the procedure, benefits and risks, including but not limited to bleeding, infection, use of mesh, and recurrence. Encouraged smoking cessation, discussed this increases the risk of poor wound healing, recurrence, and anesthetic complications. Patient verbalized understanding. He would like to proceed with surgery. Orders placed.     CC:  Umbilical hernia    HPI:    48 year old male who presents today for evaluation of an umbilical hernia. He first noticed a bulge at his umbilicus a couple of years ago. More recently, he has had increased sensitivity and tenderness. He is interested in having this repaired due to the worsening discomfort. He does have to do heavy lifting for work. He is up to date on his colonoscopy.     ENDOSCOPY:   Colonoscopy 04/2023 Dr. Grande     RADIOLOGY:   No recent pertinent imaging     LABS:    No recent labs    SOCIAL HISTORY:   Reports tobacco use, 2 pack/week  Occasional alcohol use    FAMILY HISTORY:    Colorectal cancer: Negative    PREVIOUS ABDOMINAL SURGERY:  Laparoscopic bilateral inguinal hernia repair - Dr. Covarrubias     OTHER SURGERY:  Myringotomy    Vasectomy    PAST MEDICAL HISTORY:    Hypertension   Hyperlipidemia  GERD  Psoriasis    ALLERGIES:   Bee venom - anaphylaxis  Pollen    MEDICATIONS:   Amlodipine   Atorvastatin  Omeprazole  Tadalafil  Epi pen     ROS:    No cough, chest pain, shortness of air.  All other systems reviewed and negative other than presenting complaints.    PHYSICAL EXAM:   Constitutional: Well-developed, well-nourished, no acute distress  Vital signs:   Ht:  180.3cm  Wt: 83.5kg  BMI: 25.66  Eyes: Conjunctiva normal, sclera nonicteric  ENMT: Hearing grossly normal, oral mucosa moist  Respiratory: Normal inspiratory effort  Cardiovascular: Regular rate, no peripheral edema, no jugular venous distention  Gastrointestinal: Soft, small to moderate, reducible umbilical hernia, tender to palpation  Skin: Warm, dry, no rash on visualized skin surfaces  Musculoskeletal: Symmetric strength, normal gait  Psychiatric: Alert and oriented ×3, normal affect     Alexa Seaman PA-C    [facility]   [address], [address]  [place] [address]    [address], [address]  [place] [address]    Office: [phone]  Fax: [phone]

## 2023-11-01 DIAGNOSIS — E78.2 MIXED HYPERLIPIDEMIA: ICD-10-CM

## 2023-11-01 DIAGNOSIS — I10 ESSENTIAL HYPERTENSION: ICD-10-CM

## 2023-11-01 NOTE — TELEPHONE ENCOUNTER
Rx Refill Note  Requested Prescriptions     Pending Prescriptions Disp Refills    atorvastatin (LIPITOR) 40 MG tablet 90 tablet 3     Sig: Take 1 tablet by mouth Daily.    amLODIPine (NORVASC) 10 MG tablet 90 tablet 2     Sig: Take 1 tablet by mouth Daily.      Last office visit with prescribing clinician: 5/10/2023   Last telemedicine visit with prescribing clinician: Visit date not found   Next office visit with prescribing clinician: Visit date not found                         Would you like a call back once the refill request has been completed: [] Yes [] No    If the office needs to give you a call back, can they leave a voicemail: [] Yes [] No    Jorge York MA  11/01/23, 16:27 EDT

## 2023-11-03 RX ORDER — ATORVASTATIN CALCIUM 40 MG/1
40 TABLET, FILM COATED ORAL DAILY
Qty: 90 TABLET | Refills: 3 | Status: SHIPPED | OUTPATIENT
Start: 2023-11-03

## 2023-11-03 RX ORDER — AMLODIPINE BESYLATE 10 MG/1
10 TABLET ORAL DAILY
Qty: 90 TABLET | Refills: 2 | Status: SHIPPED | OUTPATIENT
Start: 2023-11-03

## 2023-11-10 ENCOUNTER — PRE-ADMISSION TESTING (OUTPATIENT)
Dept: PREADMISSION TESTING | Facility: HOSPITAL | Age: 48
End: 2023-11-10
Payer: COMMERCIAL

## 2023-11-10 VITALS
TEMPERATURE: 98.1 F | BODY MASS INDEX: 24.05 KG/M2 | SYSTOLIC BLOOD PRESSURE: 139 MMHG | HEIGHT: 71 IN | OXYGEN SATURATION: 99 % | DIASTOLIC BLOOD PRESSURE: 95 MMHG | WEIGHT: 171.8 LBS | HEART RATE: 89 BPM | RESPIRATION RATE: 16 BRPM

## 2023-11-10 LAB
ANION GAP SERPL CALCULATED.3IONS-SCNC: 10.9 MMOL/L (ref 5–15)
BUN SERPL-MCNC: 11 MG/DL (ref 6–20)
BUN/CREAT SERPL: 12.1 (ref 7–25)
CALCIUM SPEC-SCNC: 9.8 MG/DL (ref 8.6–10.5)
CHLORIDE SERPL-SCNC: 103 MMOL/L (ref 98–107)
CO2 SERPL-SCNC: 25.1 MMOL/L (ref 22–29)
CREAT SERPL-MCNC: 0.91 MG/DL (ref 0.76–1.27)
DEPRECATED RDW RBC AUTO: 39.3 FL (ref 37–54)
EGFRCR SERPLBLD CKD-EPI 2021: 104 ML/MIN/1.73
ERYTHROCYTE [DISTWIDTH] IN BLOOD BY AUTOMATED COUNT: 12.3 % (ref 12.3–15.4)
GLUCOSE SERPL-MCNC: 92 MG/DL (ref 65–99)
HCT VFR BLD AUTO: 49.6 % (ref 37.5–51)
HGB BLD-MCNC: 16.9 G/DL (ref 13–17.7)
MCH RBC QN AUTO: 30 PG (ref 26.6–33)
MCHC RBC AUTO-ENTMCNC: 34.1 G/DL (ref 31.5–35.7)
MCV RBC AUTO: 88.1 FL (ref 79–97)
PLATELET # BLD AUTO: 314 10*3/MM3 (ref 140–450)
PMV BLD AUTO: 10.2 FL (ref 6–12)
POTASSIUM SERPL-SCNC: 3.7 MMOL/L (ref 3.5–5.2)
RBC # BLD AUTO: 5.63 10*6/MM3 (ref 4.14–5.8)
SODIUM SERPL-SCNC: 139 MMOL/L (ref 136–145)
WBC NRBC COR # BLD: 9.88 10*3/MM3 (ref 3.4–10.8)

## 2023-11-10 PROCEDURE — 36415 COLL VENOUS BLD VENIPUNCTURE: CPT

## 2023-11-10 PROCEDURE — 85027 COMPLETE CBC AUTOMATED: CPT

## 2023-11-10 PROCEDURE — 80048 BASIC METABOLIC PNL TOTAL CA: CPT

## 2023-11-10 NOTE — DISCHARGE INSTRUCTIONS
CHLORHEXIDINE CLOTH INSTRUCTIONS  The morning of surgery follow these instructions using the Chlorhexidine cloths you've been given.  These steps reduce bacteria on the body.  Do not use the cloths near your eyes, ears mouth, genitalia or on open wounds.  Throw the cloths away after use but do not try to flush them down a toilet.      Open and remove one cloth at a time from the package.    Leave the cloth unfolded and begin the bathing.  Massage the skin with the cloths using gentle pressure to remove bacteria.  Do not scrub harshly.   Follow the steps below with one 2% CHG cloth per area (6 total cloths).  One cloth for neck, shoulders and chest.  One cloth for both arms, hands, fingers and underarms (do underarms last).  One cloth for the abdomen followed by groin.  One cloth for right leg and foot including between the toes.  One cloth for left leg and foot including between the toes.  The last cloth is to be used for the back of the neck, back and buttocks.    Allow the CHG to air dry 3 minutes on the skin which will give it time to work and decrease the chance of irritation.  The skin may feel sticky until it is dry.  Do not rinse with water or any other liquid or you will lose the beneficial effects of the CHG.  If mild skin irritation occurs, do rinse the skin to remove the CHG.  Report this to the nurse at time of admission.  Do not apply lotions, creams, ointments, deodorants or perfumes after using the clothes. Dress in clean clothes before coming to the hospital.    Take the following medications the morning of surgery: AMLODIPINE AND OMEPRAZOLE    ARRIVAL TIME 06:30      General Instructions:  Do not eat solid food after midnight the night before surgery.  You may drink clear liquids day of surgery but must stop at least one hour before your hospital arrival time.  It is beneficial for you to have a clear drink that contains carbohydrates the day of surgery.  We suggest a 12 to 20 ounce bottle of  Gatorade or Powerade for non-diabetic patients or a 12 to 20 ounce bottle of G2 or Powerade Zero for diabetic patients. (Pediatric patients, are not advised to drink a 12 to 20 ounce carbohydrate drink)    Clear liquids are liquids you can see through.  Nothing red in color.     Plain water                               Sports drinks  Sodas                                   Gelatin (Jell-O)  Fruit juices without pulp such as white grape juice and apple juice  Popsicles that contain no fruit or yogurt  Tea or coffee (no cream or milk added)  Gatorade / Powerade  G2 / Powerade Zero    Infants may have breast milk up to four hours before surgery.  Infants drinking formula may drink formula up to six hours before surgery.   Patients who avoid smoking, chewing tobacco and alcohol for 4 weeks prior to surgery have a reduced risk of post-operative complications.  Quit smoking as many days before surgery as you can.  Do not smoke, use chewing tobacco or drink alcohol the day of surgery.   If applicable bring your C-PAP/ BI-PAP machine in with you to preop day of surgery.  Bring any papers given to you in the doctor’s office.  Wear clean comfortable clothes.  Do not wear contact lenses, false eyelashes or make-up.  Bring a case for your glasses.   Bring crutches or walker if applicable.  Remove all piercings.  Leave jewelry and any other valuables at home.  Hair extensions with metal clips must be removed prior to surgery.  The Pre-Admission Testing nurse will instruct you to bring medications if unable to obtain an accurate list in Pre-Admission Testing.        If you were given a blood bank ID arm band remember to bring it with you the day of surgery.    Preventing a Surgical Site Infection:  For 2 to 3 days before surgery, avoid shaving with a razor because the razor can irritate skin and make it easier to develop an infection.    Any areas of open skin can increase the risk of a post-operative wound infection by allowing  bacteria to enter and travel throughout the body.  Notify your surgeon if you have any skin wounds / rashes even if it is not near the expected surgical site.  The area will need assessed to determine if surgery should be delayed until it is healed.  The night prior to surgery shower using a fresh bar of anti-bacterial soap (such as Dial) and clean washcloth.  Sleep in a clean bed with clean clothing.  Do not allow pets to sleep with you.  Shower on the morning of surgery using a fresh bar of anti-bacterial soap (such as Dial) and clean washcloth.  Dry with a clean towel and dress in clean clothing.  Ask your surgeon if you will be receiving antibiotics prior to surgery.  Make sure you, your family, and all healthcare providers clean their hands with soap and water or an alcohol based hand  before caring for you or your wound.    Day of surgery:  Your arrival time is approximately two hours before your scheduled surgery time.  Upon arrival, a Pre-op nurse and Anesthesiologist will review your health history, obtain vital signs, and answer questions you may have.  The only belongings needed at this time will be a list of your home medications and if applicable your C-PAP/BI-PAP machine.  A Pre-op nurse will start an IV and you may receive medication in preparation for surgery, including something to help you relax.     Please be aware that surgery does come with discomfort.  We want to make every effort to control your discomfort so please discuss any uncontrolled symptoms with your nurse.   Your doctor will most likely have prescribed pain medications.      If you are going home after surgery you will receive individualized written care instructions before being discharged.  A responsible adult must drive you to and from the hospital on the day of your surgery and stay with you for 24 hours.  Discharge prescriptions can be filled by the hospital pharmacy during regular pharmacy hours.  If you are having  surgery late in the day/evening your prescription may be e-prescribed to your pharmacy.  Please verify your pharmacy hours or chose a 24 hour pharmacy to avoid not having access to your prescription because your pharmacy has closed for the day.    If you are staying overnight following surgery, you will be transported to your hospital room following the recovery period.  [facility] has all private rooms.    If you have any questions please call Pre-Admission Testing at [phone].  Deductibles and co-payments are collected on the day of service. Please be prepared to pay the required co-pay, deductible or deposit on the day of service as defined by your plan.    Call your surgeon immediately if you experience any of the following symptoms:  Sore Throat  Shortness of Breath or difficulty breathing  Cough  Chills  Body soreness or muscle pain  Headache  Fever  New loss of taste or smell  Do not arrive for your surgery ill.  Your procedure will need to be rescheduled to another time.  You will need to call your physician before the day of surgery to avoid any unnecessary exposure to hospital staff as well as other patients.

## 2023-11-17 ENCOUNTER — ANESTHESIA (OUTPATIENT)
Dept: PERIOP | Facility: HOSPITAL | Age: 48
End: 2023-11-17
Payer: COMMERCIAL

## 2023-11-17 ENCOUNTER — ANESTHESIA EVENT (OUTPATIENT)
Dept: PERIOP | Facility: HOSPITAL | Age: 48
End: 2023-11-17
Payer: COMMERCIAL

## 2023-11-17 ENCOUNTER — HOSPITAL ENCOUNTER (OUTPATIENT)
Facility: HOSPITAL | Age: 48
Setting detail: HOSPITAL OUTPATIENT SURGERY
Discharge: HOME | End: 2023-11-17
Attending: SURGERY | Admitting: SURGERY
Payer: COMMERCIAL

## 2023-11-17 VITALS
TEMPERATURE: 97.7 F | OXYGEN SATURATION: 99 % | HEART RATE: 66 BPM | DIASTOLIC BLOOD PRESSURE: 81 MMHG | SYSTOLIC BLOOD PRESSURE: 122 MMHG | RESPIRATION RATE: 16 BRPM

## 2023-11-17 DIAGNOSIS — K42.9 UMBILICAL HERNIA WITHOUT OBSTRUCTION AND WITHOUT GANGRENE: ICD-10-CM

## 2023-11-17 DIAGNOSIS — K21.00 GASTROESOPHAGEAL REFLUX DISEASE WITH ESOPHAGITIS WITHOUT HEMORRHAGE: ICD-10-CM

## 2023-11-17 PROCEDURE — 25010000002 CEFAZOLIN IN DEXTROSE 2-4 GM/100ML-% SOLUTION

## 2023-11-17 PROCEDURE — 49591 RPR AA HRN 1ST < 3 CM RDC: CPT | Performed by: SPECIALIST/TECHNOLOGIST, OTHER

## 2023-11-17 PROCEDURE — 49591 RPR AA HRN 1ST < 3 CM RDC: CPT | Performed by: SURGERY

## 2023-11-17 PROCEDURE — 25010000002 FENTANYL CITRATE (PF) 50 MCG/ML SOLUTION: Performed by: STUDENT IN AN ORGANIZED HEALTH CARE EDUCATION/TRAINING PROGRAM

## 2023-11-17 PROCEDURE — 25810000003 LACTATED RINGERS PER 1000 ML: Performed by: ANESTHESIOLOGY

## 2023-11-17 PROCEDURE — 25010000002 MIDAZOLAM PER 1 MG: Performed by: ANESTHESIOLOGY

## 2023-11-17 PROCEDURE — 25010000002 PROPOFOL 10 MG/ML EMULSION: Performed by: STUDENT IN AN ORGANIZED HEALTH CARE EDUCATION/TRAINING PROGRAM

## 2023-11-17 PROCEDURE — 25010000002 PROPOFOL 200 MG/20ML EMULSION: Performed by: STUDENT IN AN ORGANIZED HEALTH CARE EDUCATION/TRAINING PROGRAM

## 2023-11-17 RX ORDER — HYDROCODONE BITARTRATE AND ACETAMINOPHEN 5; 325 MG/1; MG/1
1 TABLET ORAL EVERY 4 HOURS PRN
Qty: 10 TABLET | Refills: 0 | Status: SHIPPED | OUTPATIENT
Start: 2023-11-17

## 2023-11-17 RX ORDER — CELECOXIB 100 MG/1
100 CAPSULE ORAL ONCE
Status: COMPLETED | OUTPATIENT
Start: 2023-11-17 | End: 2023-11-17

## 2023-11-17 RX ORDER — DIPHENHYDRAMINE HYDROCHLORIDE 50 MG/ML
12.5 INJECTION INTRAMUSCULAR; INTRAVENOUS
Status: DISCONTINUED | OUTPATIENT
Start: 2023-11-17 | End: 2023-11-17 | Stop reason: HOSPADM

## 2023-11-17 RX ORDER — FENTANYL CITRATE 50 UG/ML
50 INJECTION, SOLUTION INTRAMUSCULAR; INTRAVENOUS
Status: DISCONTINUED | OUTPATIENT
Start: 2023-11-17 | End: 2023-11-17 | Stop reason: HOSPADM

## 2023-11-17 RX ORDER — ONDANSETRON 2 MG/ML
4 INJECTION INTRAMUSCULAR; INTRAVENOUS ONCE AS NEEDED
Status: DISCONTINUED | OUTPATIENT
Start: 2023-11-17 | End: 2023-11-17 | Stop reason: HOSPADM

## 2023-11-17 RX ORDER — LIDOCAINE HYDROCHLORIDE 20 MG/ML
INJECTION, SOLUTION EPIDURAL; INFILTRATION; INTRACAUDAL; PERINEURAL AS NEEDED
Status: DISCONTINUED | OUTPATIENT
Start: 2023-11-17 | End: 2023-11-17 | Stop reason: SURG

## 2023-11-17 RX ORDER — PROPOFOL 10 MG/ML
INJECTION, EMULSION INTRAVENOUS AS NEEDED
Status: DISCONTINUED | OUTPATIENT
Start: 2023-11-17 | End: 2023-11-17 | Stop reason: SURG

## 2023-11-17 RX ORDER — ONDANSETRON 4 MG/1
4 TABLET, FILM COATED ORAL EVERY 6 HOURS PRN
Qty: 10 TABLET | Refills: 0 | Status: SHIPPED | OUTPATIENT
Start: 2023-11-17

## 2023-11-17 RX ORDER — MIDAZOLAM HYDROCHLORIDE 1 MG/ML
1 INJECTION INTRAMUSCULAR; INTRAVENOUS
Status: DISCONTINUED | OUTPATIENT
Start: 2023-11-17 | End: 2023-11-17 | Stop reason: HOSPADM

## 2023-11-17 RX ORDER — DROPERIDOL 2.5 MG/ML
0.62 INJECTION, SOLUTION INTRAMUSCULAR; INTRAVENOUS
Status: DISCONTINUED | OUTPATIENT
Start: 2023-11-17 | End: 2023-11-17 | Stop reason: HOSPADM

## 2023-11-17 RX ORDER — HYDROMORPHONE HYDROCHLORIDE 1 MG/ML
0.5 INJECTION, SOLUTION INTRAMUSCULAR; INTRAVENOUS; SUBCUTANEOUS
Status: DISCONTINUED | OUTPATIENT
Start: 2023-11-17 | End: 2023-11-17 | Stop reason: HOSPADM

## 2023-11-17 RX ORDER — NALOXONE HCL 0.4 MG/ML
0.2 VIAL (ML) INJECTION AS NEEDED
Status: DISCONTINUED | OUTPATIENT
Start: 2023-11-17 | End: 2023-11-17 | Stop reason: HOSPADM

## 2023-11-17 RX ORDER — HYDRALAZINE HYDROCHLORIDE 20 MG/ML
5 INJECTION INTRAMUSCULAR; INTRAVENOUS
Status: DISCONTINUED | OUTPATIENT
Start: 2023-11-17 | End: 2023-11-17 | Stop reason: HOSPADM

## 2023-11-17 RX ORDER — IPRATROPIUM BROMIDE AND ALBUTEROL SULFATE 2.5; .5 MG/3ML; MG/3ML
3 SOLUTION RESPIRATORY (INHALATION) ONCE AS NEEDED
Status: DISCONTINUED | OUTPATIENT
Start: 2023-11-17 | End: 2023-11-17 | Stop reason: HOSPADM

## 2023-11-17 RX ORDER — MAGNESIUM HYDROXIDE 1200 MG/15ML
LIQUID ORAL AS NEEDED
Status: DISCONTINUED | OUTPATIENT
Start: 2023-11-17 | End: 2023-11-17 | Stop reason: HOSPADM

## 2023-11-17 RX ORDER — ACETAMINOPHEN 500 MG
1000 TABLET ORAL ONCE
Status: COMPLETED | OUTPATIENT
Start: 2023-11-17 | End: 2023-11-17

## 2023-11-17 RX ORDER — SODIUM CHLORIDE 9 MG/ML
40 INJECTION, SOLUTION INTRAVENOUS AS NEEDED
Status: DISCONTINUED | OUTPATIENT
Start: 2023-11-17 | End: 2023-11-17 | Stop reason: HOSPADM

## 2023-11-17 RX ORDER — SODIUM CHLORIDE, SODIUM LACTATE, POTASSIUM CHLORIDE, CALCIUM CHLORIDE 600; 310; 30; 20 MG/100ML; MG/100ML; MG/100ML; MG/100ML
9 INJECTION, SOLUTION INTRAVENOUS CONTINUOUS
Status: DISCONTINUED | OUTPATIENT
Start: 2023-11-17 | End: 2023-11-17 | Stop reason: HOSPADM

## 2023-11-17 RX ORDER — HYDROCODONE BITARTRATE AND ACETAMINOPHEN 5; 325 MG/1; MG/1
1 TABLET ORAL ONCE AS NEEDED
Status: COMPLETED | OUTPATIENT
Start: 2023-11-17 | End: 2023-11-17

## 2023-11-17 RX ORDER — FAMOTIDINE 10 MG/ML
20 INJECTION, SOLUTION INTRAVENOUS ONCE
Status: COMPLETED | OUTPATIENT
Start: 2023-11-17 | End: 2023-11-17

## 2023-11-17 RX ORDER — FLUMAZENIL 0.1 MG/ML
0.2 INJECTION INTRAVENOUS AS NEEDED
Status: DISCONTINUED | OUTPATIENT
Start: 2023-11-17 | End: 2023-11-17 | Stop reason: HOSPADM

## 2023-11-17 RX ORDER — CEFAZOLIN SODIUM 2 G/100ML
2000 INJECTION, SOLUTION INTRAVENOUS ONCE
Status: COMPLETED | OUTPATIENT
Start: 2023-11-17 | End: 2023-11-17

## 2023-11-17 RX ORDER — BUPIVACAINE HYDROCHLORIDE AND EPINEPHRINE 5; 5 MG/ML; UG/ML
INJECTION, SOLUTION EPIDURAL; INTRACAUDAL; PERINEURAL AS NEEDED
Status: DISCONTINUED | OUTPATIENT
Start: 2023-11-17 | End: 2023-11-17 | Stop reason: HOSPADM

## 2023-11-17 RX ORDER — PROMETHAZINE HYDROCHLORIDE 25 MG/1
25 TABLET ORAL ONCE AS NEEDED
Status: DISCONTINUED | OUTPATIENT
Start: 2023-11-17 | End: 2023-11-17 | Stop reason: HOSPADM

## 2023-11-17 RX ORDER — FENTANYL CITRATE 50 UG/ML
INJECTION, SOLUTION INTRAMUSCULAR; INTRAVENOUS AS NEEDED
Status: DISCONTINUED | OUTPATIENT
Start: 2023-11-17 | End: 2023-11-17 | Stop reason: SURG

## 2023-11-17 RX ORDER — EPHEDRINE SULFATE 50 MG/ML
5 INJECTION, SOLUTION INTRAVENOUS ONCE AS NEEDED
Status: DISCONTINUED | OUTPATIENT
Start: 2023-11-17 | End: 2023-11-17 | Stop reason: HOSPADM

## 2023-11-17 RX ORDER — LABETALOL HYDROCHLORIDE 5 MG/ML
5 INJECTION, SOLUTION INTRAVENOUS
Status: DISCONTINUED | OUTPATIENT
Start: 2023-11-17 | End: 2023-11-17 | Stop reason: HOSPADM

## 2023-11-17 RX ORDER — SODIUM CHLORIDE 0.9 % (FLUSH) 0.9 %
3 SYRINGE (ML) INJECTION EVERY 12 HOURS SCHEDULED
Status: DISCONTINUED | OUTPATIENT
Start: 2023-11-17 | End: 2023-11-17 | Stop reason: HOSPADM

## 2023-11-17 RX ORDER — OXYCODONE AND ACETAMINOPHEN 7.5; 325 MG/1; MG/1
1 TABLET ORAL EVERY 4 HOURS PRN
Status: DISCONTINUED | OUTPATIENT
Start: 2023-11-17 | End: 2023-11-17 | Stop reason: HOSPADM

## 2023-11-17 RX ORDER — SODIUM CHLORIDE 0.9 % (FLUSH) 0.9 %
3-10 SYRINGE (ML) INJECTION AS NEEDED
Status: DISCONTINUED | OUTPATIENT
Start: 2023-11-17 | End: 2023-11-17 | Stop reason: HOSPADM

## 2023-11-17 RX ORDER — PROMETHAZINE HYDROCHLORIDE 25 MG/1
25 SUPPOSITORY RECTAL ONCE AS NEEDED
Status: DISCONTINUED | OUTPATIENT
Start: 2023-11-17 | End: 2023-11-17 | Stop reason: HOSPADM

## 2023-11-17 RX ADMIN — FENTANYL CITRATE 25 MCG: 50 INJECTION, SOLUTION INTRAMUSCULAR; INTRAVENOUS at 10:23

## 2023-11-17 RX ADMIN — PROPOFOL 20 MG: 10 INJECTION, EMULSION INTRAVENOUS at 10:19

## 2023-11-17 RX ADMIN — PROPOFOL 175 MCG/KG/MIN: 10 INJECTION, EMULSION INTRAVENOUS at 10:11

## 2023-11-17 RX ADMIN — MIDAZOLAM 1 MG: 1 INJECTION INTRAMUSCULAR; INTRAVENOUS at 07:56

## 2023-11-17 RX ADMIN — PROPOFOL 20 MG: 10 INJECTION, EMULSION INTRAVENOUS at 10:12

## 2023-11-17 RX ADMIN — CELECOXIB 100 MG: 100 CAPSULE ORAL at 07:51

## 2023-11-17 RX ADMIN — LIDOCAINE HYDROCHLORIDE 60 MG: 20 INJECTION, SOLUTION EPIDURAL; INFILTRATION; INTRACAUDAL; PERINEURAL at 10:10

## 2023-11-17 RX ADMIN — PROPOFOL 20 MG: 10 INJECTION, EMULSION INTRAVENOUS at 10:14

## 2023-11-17 RX ADMIN — ACETAMINOPHEN 1000 MG: 500 TABLET ORAL at 07:51

## 2023-11-17 RX ADMIN — CEFAZOLIN SODIUM 2000 MG: 2 INJECTION, SOLUTION INTRAVENOUS at 10:04

## 2023-11-17 RX ADMIN — SODIUM CHLORIDE, POTASSIUM CHLORIDE, SODIUM LACTATE AND CALCIUM CHLORIDE 9 ML/HR: 600; 310; 30; 20 INJECTION, SOLUTION INTRAVENOUS at 07:30

## 2023-11-17 RX ADMIN — FAMOTIDINE 20 MG: 10 INJECTION INTRAVENOUS at 07:54

## 2023-11-17 RX ADMIN — PROPOFOL 20 MG: 10 INJECTION, EMULSION INTRAVENOUS at 10:16

## 2023-11-17 RX ADMIN — PROPOFOL 20 MG: 10 INJECTION, EMULSION INTRAVENOUS at 10:10

## 2023-11-17 RX ADMIN — HYDROCODONE BITARTRATE AND ACETAMINOPHEN 1 TABLET: 5; 325 TABLET ORAL at 11:19

## 2023-11-17 RX ADMIN — FENTANYL CITRATE 25 MCG: 50 INJECTION, SOLUTION INTRAMUSCULAR; INTRAVENOUS at 10:11

## 2023-11-17 NOTE — OP NOTE
PREOPERATIVE DIAGNOSIS:  Umbilical hernia    POSTOPERATIVE DIAGNOSIS (FINDINGS):  Umbilical hernia with 1 cm fascial defect    PROCEDURE:  Open umbilical hernia repair, 1 cm fascial defect    SURGEON:  Bay Cooper MD    ASSISTANT:  Libby Hoff was responsible for performing the following activities: suction, irrigation, suturing, closing, retraction, and placing dressing, and their skilled assistance was necessary for the success of this case.    ANESTHESIA:  MAC    EBL:  Minimal    SPECIMEN(S):  none    DESCRIPTION:  In supine position under sedation, prepped and draped usual sterile manner.  Half percent Marcaine with epinephrine infiltrated locally.  Curvilinear incision made below the umbilicus and umbilical skin  from the underlying hernia sac which was circumferentially dissected to the level of the fascia and then reduced to the preperitoneal plane.  The fascial defect measured 1 cm and was closed primarily with interrupted 0 Ethibond sutures.  The umbilicus was tacked to the fascia with 3-0 Vicryl.  The skin was closed with 0 Vicryl deep dermal and 5-0 Vicryl subcuticular followed by Exofin.  Tolerated well, stable to recovery room.    Bay Cooper M.D.

## 2023-11-17 NOTE — ANESTHESIA PREPROCEDURE EVALUATION
Anesthesia Evaluation     Patient summary reviewed and Nursing notes reviewed   NPO Solid Status: > 8 hours  NPO Liquid Status: > 2 hours           Airway   Mallampati: I  TM distance: >3 FB  Neck ROM: full  No difficulty expected  Dental - normal exam     Pulmonary - negative pulmonary ROS and normal exam   (+) a smoker Current,  Cardiovascular - negative cardio ROS and normal exam  Exercise tolerance: good (4-7 METS)    (+) hypertension, hyperlipidemia      Neuro/Psych- negative ROS  GI/Hepatic/Renal/Endo - negative ROS   (+) GERD  (-) GI bleed    Musculoskeletal (-) negative ROS    Abdominal  - normal exam    Bowel sounds: normal.   Substance History - negative use     OB/GYN negative ob/gyn ROS         Other                    Anesthesia Plan    ASA 2     MAC       Anesthetic plan, risks, benefits, and alternatives have been provided, discussed and informed consent has been obtained with: patient.  Pre-procedure education provided  Plan discussed with CRNA.    CODE STATUS:

## 2023-11-17 NOTE — H&P
ASSESSMENT/PLAN:    48-year-old gentleman with increasingly symptomatic reducible umbilical hernia, wishes to proceed with open repair.  He understands rationale for the procedure, the nature of the procedure, and the risks including but not limited to bleeding, infection, use of mesh, and recurrence.    CC:     Hernia    HPI:    48-year-old gentleman with bulge at the umbilicus which has become increasingly symptomatic.  Relevant review of systems negative other than presenting complaints.    ENDOSCOPY:   Colonoscopy 8/4/2023 Dr. Hayes    SOCIAL HISTORY:   2 packs/week tobacco use  Occasional alcohol use    FAMILY HISTORY:    Colorectal cancer: Negative    PREVIOUS ABDOMINAL SURGERY    Laparoscopic bilateral inguinal hernia repair Dr. Covarrubias    PAST MEDICAL HISTORY:    Hypertension  Hyperlipidemia  GERD  Psoriasis    MEDICATIONS:   Amlodipine  Atorvastatin  Omeprazole  Tadalafil  EpiPen as needed    ALLERGIES:   Bee venom-anaphylaxis  Pollen    PHYSICAL EXAM:   Constitutional: No acute distress  Vital signs:   Weight: 171 pounds  Height: 71 inches  BMI: 24  Respiratory: Normal nonlabored inspiratory effort  Cardiovascular: Regular rate, no jugular venous distention  Gastrointestinal: Soft, small to moderate reducible umbilical hernia    IGNACIO ZARATE M.D.

## 2023-11-17 NOTE — DISCHARGE INSTRUCTIONS
Dr. Bay Cooper  [address]  [place] [id number] [phone]    Discharge Instructions for Hernia Surgery    Go home, rest and take it easy today; however, you should get up and move about several times today to reduce the risk of developing a clot in your legs.      You may experience some dizziness or memory loss from the anesthesia.  This may last for the next 24 hours.  Someone should plan on staying with you for the first 24 hours for your safety.    Do not make any important legal decisions or sign any legal papers for the next 24 hours.      Eat and drink lightly today.  Start off with liquids, jello, soup, crackers or other bland foods at first. You may advance your diet tomorrow as tolerated as long as you do not experience any nausea or vomiting.     If skin glue (Dermabond) was used, your incisions are protected and covered.  The invisible glue will dissolve on its own as your incision heals. If dressings were used, you may remove your outer dressings in 3 days.  The white tapes called steri-strips should stay in place.  They will fall off on their own in 1-2 weeks.  Do not worry if they come off sooner.      If dressings were used, you may notice some bleeding/drainage on your outer dressings. A little bloody drainage is normal. If the bleeding/drainage is such that the bandage cannot absorb it, remove the dressing, apply clean gauze and apply firm pressure for a full 15 minutes.  If the bleeding continues, please call me.    You may shower tomorrow allowing water to run over the incisions; however, do not scrub the incisions.  No tub baths until your incisions are completely healed.      No lifting > 20 lbs. until you are seen at your follow-up visit.         You have received a prescription for a narcotic pain medicine, as you will have some pain following surgery.   You will not be totally pain free, but your pain medicine should make the pain tolerable.  Please take your pain  medicine as prescribed and always take your pills with food to prevent nausea. If you are having severe pain that cannot be controlled by the pain medicine, please contact me.      You have also received a prescription for an anti-nausea medicine.  Please take this as prescribed for any nausea or vomiting.  Nausea could be a result of the anesthesia or a result of the narcotic pain medicine.  If you experience severe nausea and vomiting that cannot be controlled by the nausea medicine, please call me.      If you had a laparoscopic surgery, it is not unusual to experience pain/discomfort in your shoulders or under your ribs after surgery.  It is from the gas used during the laparoscopic procedure and usually lasts 1-3 days.  The prescription pain medicine is used to treat the surgical pain and does not typically alleviate this “gassy” pain.     No driving for 24 hours and for as long as you are taking your prescription pain medicine.    You will need to call the office at [phone] to schedule a follow-up appointment in 6-10 days.     Remember to contact me for any of the following:    Fever > 101 degrees  Severe pain that cannot be controlled by taking your pain pills  Severe nausea or vomiting that cannot be controlled by taking your nausea pills  Significant bleeding of your incisions  Drainage that has a bad smell or is yellow or green in appearance  Any other questions or concerns      Additional Instruction for Inguinal Hernia Patients Only    If you did not urinate at the hospital after your surgery or if you feel the need to urinate and cannot, this will necessitate a return to the Emergency Room for placement of a urinary catheter.  You should also notify me as well.  As a rule, you should be able to empty your bladder within 4-6 hours after discharge from the hospital.      You may notice some scrotal bruising and/or swelling. A scrotal support or briefs as well as ice packs may be used to alleviate  discomfort.    '

## 2023-11-20 ENCOUNTER — TELEPHONE (OUTPATIENT)
Dept: SURGERY | Facility: CLINIC | Age: 48
End: 2023-11-20
Payer: COMMERCIAL

## 2023-11-20 DIAGNOSIS — K42.9 UMBILICAL HERNIA WITHOUT OBSTRUCTION AND WITHOUT GANGRENE: Primary | ICD-10-CM

## 2023-11-20 RX ORDER — HYDROCODONE BITARTRATE AND ACETAMINOPHEN 5; 325 MG/1; MG/1
1 TABLET ORAL EVERY 4 HOURS PRN
Qty: 10 TABLET | Refills: 0 | Status: SHIPPED | OUTPATIENT
Start: 2023-11-20

## 2023-11-20 NOTE — TELEPHONE ENCOUNTER
Patient called in requesting a refill of his pain medication. S/P Open umbilical hernia repair, 1 cm fascial defect  on 11/17/23. He states that he has been trying to use Ibuprofen, but it doesn't give any relief. Pharmacy is correct in chart.

## 2023-11-27 ENCOUNTER — OFFICE VISIT (OUTPATIENT)
Dept: FAMILY MEDICINE CLINIC | Facility: CLINIC | Age: 48
End: 2023-11-27
Payer: COMMERCIAL

## 2023-11-27 VITALS
DIASTOLIC BLOOD PRESSURE: 82 MMHG | HEIGHT: 71 IN | OXYGEN SATURATION: 97 % | BODY MASS INDEX: 25.34 KG/M2 | HEART RATE: 85 BPM | RESPIRATION RATE: 18 BRPM | TEMPERATURE: 97.5 F | SYSTOLIC BLOOD PRESSURE: 120 MMHG | WEIGHT: 181 LBS

## 2023-11-27 DIAGNOSIS — K42.9 UMBILICAL HERNIA WITHOUT OBSTRUCTION AND WITHOUT GANGRENE: ICD-10-CM

## 2023-11-27 DIAGNOSIS — M54.50 ACUTE MIDLINE LOW BACK PAIN WITHOUT SCIATICA: Primary | ICD-10-CM

## 2023-11-27 RX ORDER — METHYLPREDNISOLONE ACETATE 80 MG/ML
80 INJECTION, SUSPENSION INTRA-ARTICULAR; INTRALESIONAL; INTRAMUSCULAR; SOFT TISSUE ONCE
Status: COMPLETED | OUTPATIENT
Start: 2023-11-27 | End: 2023-11-27

## 2023-11-27 RX ORDER — DEXAMETHASONE SODIUM PHOSPHATE 10 MG/ML
10 INJECTION INTRAMUSCULAR; INTRAVENOUS ONCE
Status: COMPLETED | OUTPATIENT
Start: 2023-11-27 | End: 2023-11-27

## 2023-11-27 RX ORDER — KETOROLAC TROMETHAMINE 30 MG/ML
60 INJECTION, SOLUTION INTRAMUSCULAR; INTRAVENOUS ONCE
Status: COMPLETED | OUTPATIENT
Start: 2023-11-27 | End: 2023-11-27

## 2023-11-27 RX ADMIN — KETOROLAC TROMETHAMINE 60 MG: 30 INJECTION, SOLUTION INTRAMUSCULAR; INTRAVENOUS at 10:56

## 2023-11-27 RX ADMIN — DEXAMETHASONE SODIUM PHOSPHATE 10 MG: 10 INJECTION INTRAMUSCULAR; INTRAVENOUS at 10:55

## 2023-11-27 RX ADMIN — METHYLPREDNISOLONE ACETATE 80 MG: 80 INJECTION, SUSPENSION INTRA-ARTICULAR; INTRALESIONAL; INTRAMUSCULAR; SOFT TISSUE at 10:56

## 2023-11-27 NOTE — PROGRESS NOTES
"Chief Complaint  Chief Complaint   Patient presents with    Hospital Follow Up Visit     Pt here for f/u of hernia surgery. Pt states lower back is still hurting     Transitional Care Progress Note        Subjective    History of Present Illness        Pio Bazzi II is a 48 y.o. male who presents for a transitional care management visit.    Within 48 business hours after discharge our office contacted him via telephone to coordinate his care and needs.      I reviewed and discussed the details of that call along with the discharge summary, hospital problems, inpatient lab results, inpatient diagnostic studies, and consultation reports with Pio.     Current outpatient and discharge medications have been reconciled for the patient.  Reviewed by: Alexey Box Sr., MD           No data to display              Risk for Readmission (LACE) No data recorded    Pio Bazzi II presents to [facility] for   History of Present Illness  Patient had an umbilical hernia that was repaired on 11/17/2023.he is doing well since his surgery and has not had any problems.  He has not had any complications from the surgery.  Back Pain  This is a new problem. The current episode started more than 1 month ago. The problem has been gradually worsening since onset. The pain is present in the lumbar spine. The pain is moderate. The symptoms are aggravated by bending, twisting and position. Stiffness is present All day. He has tried heat and muscle relaxant for the symptoms. The treatment provided no relief.        Objective   Vital Signs:   Visit Vitals  /82   Pulse 85   Temp 97.5 °F (36.4 °C)   Resp 18   Ht 180.3 cm (71\")   Wt 82.1 kg (181 lb)   SpO2 97%   BMI 25.24 kg/m²       BMI is >= 25 and <30. (Overweight) The following options were offered after discussion;: exercise counseling/recommendations and nutrition counseling/recommendations     Physical Exam  Vitals reviewed.   Constitutional:  "      Appearance: He is well-developed.   HENT:      Head: Normocephalic and atraumatic.   Eyes:      Conjunctiva/sclera: Conjunctivae normal.   Cardiovascular:      Rate and Rhythm: Normal rate and regular rhythm.      Heart sounds: Normal heart sounds.   Pulmonary:      Effort: Pulmonary effort is normal.      Breath sounds: Normal breath sounds.   Abdominal:      General: Bowel sounds are normal.      Palpations: Abdomen is soft.   Musculoskeletal:      Cervical back: Normal range of motion.      Lumbar back: Spasms, tenderness and bony tenderness present. Decreased range of motion.   Skin:     General: Skin is warm and dry.   Neurological:      Mental Status: He is alert and oriented to person, place, and time.            Result Review :                      Assessment and Plan      Diagnoses and all orders for this visit:    1. Acute midline low back pain without sciatica (Primary)  Assessment & Plan:  He was given Toradol 60 mg, Depo 80 mg and dexamethasone 10 mg IM injection.  Patient was advised to use heat and NSAIDs to treat his symptoms.    Orders:  -     dexAMETHasone (DECADRON) injection 10 mg  -     methylPREDNISolone acetate (DEPO-medrol) injection 80 mg  -     ketorolac (TORADOL) injection 60 mg    2. Umbilical hernia without obstruction and without gangrene  Assessment & Plan:  Patient underwent umbilical hernia repair.  Patient symptoms have improved since his surgery.                 Follow Up   No follow-ups on file.  Patient was given instructions and counseling regarding his condition or for health maintenance advice. Please see specific information pulled into the AVS if appropriate.

## 2023-11-30 ENCOUNTER — OFFICE VISIT (OUTPATIENT)
Dept: SURGERY | Facility: CLINIC | Age: 48
End: 2023-11-30
Payer: COMMERCIAL

## 2023-11-30 VITALS
WEIGHT: 184.4 LBS | BODY MASS INDEX: 25.81 KG/M2 | HEIGHT: 71 IN | DIASTOLIC BLOOD PRESSURE: 80 MMHG | SYSTOLIC BLOOD PRESSURE: 120 MMHG

## 2023-11-30 DIAGNOSIS — Z09 ENCOUNTER FOR FOLLOW-UP: Primary | ICD-10-CM

## 2023-11-30 PROCEDURE — 99024 POSTOP FOLLOW-UP VISIT: CPT | Performed by: PHYSICIAN ASSISTANT

## 2023-11-30 NOTE — PROGRESS NOTES
This is a 48-year-old gentleman returning to the office today status post open umbilical hernia repair that was performed on 11/17/2023.  He has slowly been to returning to his normal activities which I encouraged him to continue to do so.  Advised him not to lift greater than 20 pounds until he is at least 4 weeks out from surgery.  Given the strenuous nature of his job I did write him a return to work note for 12/22/2023 without restrictions.  His incision has healed very well and he was informed the skin glue will peel off over the next several days.  All questions were answered and he was willing to proceed with all recommendations.    Jorge Haddad PA-C

## 2023-11-30 NOTE — LETTER
November 30, 2023     Patient: Pio Bazzi II   YOB: 1975   Date of Visit: 11/30/2023       To Whom It May Concern:    It is my medical opinion that Pio Bazzi may return to work on Dec 22, 2023 without restrictions .           Sincerely,        Bay Cooper MD    CC:   No Recipients

## 2023-12-19 ENCOUNTER — APPOINTMENT (OUTPATIENT)
Dept: CT IMAGING | Facility: HOSPITAL | Age: 48
End: 2023-12-19
Payer: COMMERCIAL

## 2023-12-19 ENCOUNTER — HOSPITAL ENCOUNTER (EMERGENCY)
Facility: HOSPITAL | Age: 48
Discharge: HOME | End: 2023-12-19
Attending: EMERGENCY MEDICINE | Admitting: EMERGENCY MEDICINE
Payer: COMMERCIAL

## 2023-12-19 ENCOUNTER — APPOINTMENT (OUTPATIENT)
Dept: GENERAL RADIOLOGY | Facility: HOSPITAL | Age: 48
End: 2023-12-19
Payer: COMMERCIAL

## 2023-12-19 VITALS
OXYGEN SATURATION: 97 % | BODY MASS INDEX: 24.87 KG/M2 | RESPIRATION RATE: 18 BRPM | SYSTOLIC BLOOD PRESSURE: 150 MMHG | HEART RATE: 80 BPM | DIASTOLIC BLOOD PRESSURE: 98 MMHG | HEIGHT: 72 IN | WEIGHT: 183.64 LBS | TEMPERATURE: 98 F

## 2023-12-19 DIAGNOSIS — S23.9XXA THORACIC SPRAIN: ICD-10-CM

## 2023-12-19 DIAGNOSIS — R91.1 PULMONARY NODULE: ICD-10-CM

## 2023-12-19 DIAGNOSIS — S13.9XXA NECK SPRAIN, INITIAL ENCOUNTER: ICD-10-CM

## 2023-12-19 DIAGNOSIS — S06.0X1A CONCUSSION WITH LOSS OF CONSCIOUSNESS OF 30 MINUTES OR LESS, INITIAL ENCOUNTER: Primary | ICD-10-CM

## 2023-12-19 DIAGNOSIS — R55 SYNCOPE, UNSPECIFIED SYNCOPE TYPE: ICD-10-CM

## 2023-12-19 LAB
ALBUMIN SERPL-MCNC: 4.4 G/DL (ref 3.5–5.2)
ALBUMIN/GLOB SERPL: 1.5 G/DL
ALP SERPL-CCNC: 72 U/L (ref 39–117)
ALT SERPL W P-5'-P-CCNC: 29 U/L (ref 1–41)
AMPHET+METHAMPHET UR QL: NEGATIVE
ANION GAP SERPL CALCULATED.3IONS-SCNC: 11 MMOL/L (ref 5–15)
APTT PPP: 23.7 SECONDS (ref 61–76.5)
AST SERPL-CCNC: 22 U/L (ref 1–40)
BACTERIA UR QL AUTO: ABNORMAL /HPF
BARBITURATES UR QL SCN: NEGATIVE
BASOPHILS # BLD AUTO: 0.1 10*3/MM3 (ref 0–0.2)
BASOPHILS NFR BLD AUTO: 0.8 % (ref 0–1.5)
BENZODIAZ UR QL SCN: NEGATIVE
BILIRUB SERPL-MCNC: 0.2 MG/DL (ref 0–1.2)
BILIRUB UR QL STRIP: NEGATIVE
BUN SERPL-MCNC: 14 MG/DL (ref 6–20)
BUN/CREAT SERPL: 16.3 (ref 7–25)
CALCIUM SPEC-SCNC: 9.7 MG/DL (ref 8.6–10.5)
CANNABINOIDS SERPL QL: POSITIVE
CHLORIDE SERPL-SCNC: 105 MMOL/L (ref 98–107)
CLARITY UR: CLEAR
CO2 SERPL-SCNC: 26 MMOL/L (ref 22–29)
COCAINE UR QL: NEGATIVE
COLOR UR: YELLOW
CREAT SERPL-MCNC: 0.86 MG/DL (ref 0.76–1.27)
DEPRECATED RDW RBC AUTO: 43.8 FL (ref 37–54)
EGFRCR SERPLBLD CKD-EPI 2021: 106.8 ML/MIN/1.73
EOSINOPHIL # BLD AUTO: 0.1 10*3/MM3 (ref 0–0.4)
EOSINOPHIL NFR BLD AUTO: 1.1 % (ref 0.3–6.2)
ERYTHROCYTE [DISTWIDTH] IN BLOOD BY AUTOMATED COUNT: 13.6 % (ref 12.3–15.4)
GEN 5 2HR TROPONIN T REFLEX: <6 NG/L
GLOBULIN UR ELPH-MCNC: 3 GM/DL
GLUCOSE SERPL-MCNC: 95 MG/DL (ref 65–99)
GLUCOSE UR STRIP-MCNC: NEGATIVE MG/DL
HCT VFR BLD AUTO: 48.6 % (ref 37.5–51)
HGB BLD-MCNC: 16.1 G/DL (ref 13–17.7)
HGB UR QL STRIP.AUTO: ABNORMAL
HYALINE CASTS UR QL AUTO: ABNORMAL /LPF
INR PPP: <0.93 (ref 0.93–1.1)
KETONES UR QL STRIP: ABNORMAL
LEUKOCYTE ESTERASE UR QL STRIP.AUTO: NEGATIVE
LYMPHOCYTES # BLD AUTO: 1.6 10*3/MM3 (ref 0.7–3.1)
LYMPHOCYTES NFR BLD AUTO: 19.4 % (ref 19.6–45.3)
MAGNESIUM SERPL-MCNC: 2.1 MG/DL (ref 1.6–2.6)
MCH RBC QN AUTO: 30.6 PG (ref 26.6–33)
MCHC RBC AUTO-ENTMCNC: 33.1 G/DL (ref 31.5–35.7)
MCV RBC AUTO: 92.4 FL (ref 79–97)
METHADONE UR QL SCN: NEGATIVE
MONOCYTES # BLD AUTO: 0.7 10*3/MM3 (ref 0.1–0.9)
MONOCYTES NFR BLD AUTO: 8.6 % (ref 5–12)
NEUTROPHILS NFR BLD AUTO: 5.8 10*3/MM3 (ref 1.7–7)
NEUTROPHILS NFR BLD AUTO: 70.1 % (ref 42.7–76)
NITRITE UR QL STRIP: NEGATIVE
NRBC BLD AUTO-RTO: 0 /100 WBC (ref 0–0.2)
OPIATES UR QL: NEGATIVE
OXYCODONE UR QL SCN: NEGATIVE
PH UR STRIP.AUTO: 5.5 [PH] (ref 5–8)
PLATELET # BLD AUTO: 277 10*3/MM3 (ref 140–450)
PMV BLD AUTO: 8.1 FL (ref 6–12)
POTASSIUM SERPL-SCNC: 4.5 MMOL/L (ref 3.5–5.2)
PROT SERPL-MCNC: 7.4 G/DL (ref 6–8.5)
PROT UR QL STRIP: NEGATIVE
PROTHROMBIN TIME: 9.9 SECONDS (ref 9.6–11.7)
RBC # BLD AUTO: 5.26 10*6/MM3 (ref 4.14–5.8)
RBC # UR STRIP: ABNORMAL /HPF
REF LAB TEST METHOD: ABNORMAL
SODIUM SERPL-SCNC: 142 MMOL/L (ref 136–145)
SP GR UR STRIP: 1.02 (ref 1–1.03)
SQUAMOUS #/AREA URNS HPF: ABNORMAL /HPF
TROPONIN T DELTA: NORMAL
TROPONIN T SERPL HS-MCNC: <6 NG/L
UROBILINOGEN UR QL STRIP: ABNORMAL
WBC # UR STRIP: ABNORMAL /HPF
WBC NRBC COR # BLD AUTO: 8.3 10*3/MM3 (ref 3.4–10.8)

## 2023-12-19 PROCEDURE — 80307 DRUG TEST PRSMV CHEM ANLYZR: CPT | Performed by: EMERGENCY MEDICINE

## 2023-12-19 PROCEDURE — 70450 CT HEAD/BRAIN W/O DYE: CPT

## 2023-12-19 PROCEDURE — 36415 COLL VENOUS BLD VENIPUNCTURE: CPT

## 2023-12-19 PROCEDURE — 80053 COMPREHEN METABOLIC PANEL: CPT | Performed by: EMERGENCY MEDICINE

## 2023-12-19 PROCEDURE — 83735 ASSAY OF MAGNESIUM: CPT | Performed by: EMERGENCY MEDICINE

## 2023-12-19 PROCEDURE — 81001 URINALYSIS AUTO W/SCOPE: CPT | Performed by: EMERGENCY MEDICINE

## 2023-12-19 PROCEDURE — 25510000001 IOPAMIDOL PER 1 ML: Performed by: EMERGENCY MEDICINE

## 2023-12-19 PROCEDURE — 72125 CT NECK SPINE W/O DYE: CPT

## 2023-12-19 PROCEDURE — 84484 ASSAY OF TROPONIN QUANT: CPT | Performed by: EMERGENCY MEDICINE

## 2023-12-19 PROCEDURE — 71275 CT ANGIOGRAPHY CHEST: CPT

## 2023-12-19 PROCEDURE — 85730 THROMBOPLASTIN TIME PARTIAL: CPT | Performed by: EMERGENCY MEDICINE

## 2023-12-19 PROCEDURE — 93005 ELECTROCARDIOGRAM TRACING: CPT | Performed by: EMERGENCY MEDICINE

## 2023-12-19 PROCEDURE — 85610 PROTHROMBIN TIME: CPT | Performed by: EMERGENCY MEDICINE

## 2023-12-19 PROCEDURE — 85025 COMPLETE CBC W/AUTO DIFF WBC: CPT | Performed by: EMERGENCY MEDICINE

## 2023-12-19 PROCEDURE — 73070 X-RAY EXAM OF ELBOW: CPT

## 2023-12-19 PROCEDURE — 99285 EMERGENCY DEPT VISIT HI MDM: CPT

## 2023-12-19 PROCEDURE — 25810000003 SODIUM CHLORIDE 0.9 % SOLUTION: Performed by: EMERGENCY MEDICINE

## 2023-12-19 RX ORDER — SODIUM CHLORIDE 0.9 % (FLUSH) 0.9 %
10 SYRINGE (ML) INJECTION AS NEEDED
Status: DISCONTINUED | OUTPATIENT
Start: 2023-12-19 | End: 2023-12-19 | Stop reason: HOSPADM

## 2023-12-19 RX ADMIN — IOPAMIDOL 100 ML: 755 INJECTION, SOLUTION INTRAVENOUS at 11:09

## 2023-12-19 RX ADMIN — SODIUM CHLORIDE 1000 ML: 9 INJECTION, SOLUTION INTRAVENOUS at 10:03

## 2023-12-19 NOTE — ED PROVIDER NOTES
"Subjective   History of Present Illness  Chief complaint: Patient is a 48-year-old who last night had to double shot vodka beverages.  He states having a drink is not \"abnormal for me\".  But his wife states that he does not typically drink vodka.  He started to stumble and his wife saw him and tried to catch him and he fell straight back striking his head.  He was unconscious for 5 to 6 minutes.  He did not want to come to the hospital last night after waking.  He has been dizzy.  He did have 5 weeks ago and umbilical hernia repair.  He states since that time he has noted intermittent fluttering in his chest that takes his breath away.  He states he really does not have any chest pain.  With these are new symptoms since then.  He hit both elbows as well and has pain there.  He has no low back pain.  No pain in his lower extremities.    Context:    Duration:    Timing: Acute sudden onset    Severity:    Associated Symptoms:        PCP:  LMP:      Review of Systems   Eyes: Negative.    Respiratory:  Positive for shortness of breath.    Cardiovascular:  Positive for palpitations. Negative for chest pain.   Gastrointestinal:  Negative for abdominal pain.   Musculoskeletal:  Positive for back pain.   Neurological:  Positive for dizziness and headaches.   Psychiatric/Behavioral:  Positive for confusion.        Past Medical History:   Diagnosis Date    Acute midline low back pain without sciatica     Impression: He was given Toradol 60mg IM inj and Depo/Decadron IM inj. He was started on Prednisone, Ibuprofen, and Zanaflex.    Chicken pox     Elevated cholesterol     GERD (gastroesophageal reflux disease)     Hernia, abdominal     Hyperlipidemia     Hypertension     Psoriasis     Shingles     Impression: He will be started on Acyclovir, Prednisone and Gabapentin to treat his pain. He was encouraged to RTC if his symptoms did not improve.    Tobacco use disorder     Umbilical hernia        Allergies   Allergen Reactions    " Bee Venom Anaphylaxis     anaphylactic reaction to a bee sting    Pollen Extract Unknown - Low Severity       Past Surgical History:   Procedure Laterality Date    COLONOSCOPY N/A 04/19/2023    Procedure: COLONOSCOPY FOR SCREENING with polypectomy;  Surgeon: Eduardo Grande MD;  [place] [facility] [place]  Service: Gastroenterology;  Laterality: N/A;  Ascending Polyp, Cecal Polyp, Hemorrhoids    EAR TUBES      HERNIA REPAIR Bilateral     Inguinal    UMBILICAL HERNIA REPAIR N/A 11/17/2023    Procedure: UMBILICAL HERNIA REPAIR;  Surgeon: Bay Cooper MD;  [place] [facility] [place] [facility] [place]  Service: General;  Laterality: N/A;    VASECTOMY         Family History   Problem Relation Age of Onset    Coronary artery disease Mother     Hypertension Mother     Coronary artery disease Father     Hypertension Father     Malig Hyperthermia Neg Hx        Social History     Socioeconomic History    Marital status:    Tobacco Use    Smoking status: Some Days     Packs/day: 0.25     Years: 15.00     Additional pack years: 0.00     Total pack years: 3.75     Types: Cigarettes    Smokeless tobacco: Never   Vaping Use    Vaping Use: Never used   Substance and Sexual Activity    Alcohol use: Yes     Comment: SOCIAL USE ONLY    Drug use: Yes     Types: Marijuana     Comment: 2 TIMES PER MONTH    Sexual activity: Yes     Partners: Female           Objective   Physical Exam  Vitals and nursing note reviewed.   Constitutional:       Appearance: Normal appearance.   HENT:      Head: Normocephalic.        Comments: Tenderness with no step-offs.  No open wounds are present.  There is skin discoloration to this area.  Eyes:      Extraocular Movements: Extraocular movements intact.      Pupils: Pupils are equal, round, and reactive to light.   Neck:     Cardiovascular:      Rate and Rhythm: Normal rate and regular rhythm.      Heart sounds: Normal heart sounds.   Pulmonary:      Effort: Pulmonary effort is normal.      Breath sounds:  Normal breath sounds.   Abdominal:      Tenderness: There is no abdominal tenderness.   Musculoskeletal:      Cervical back: Tenderness present. Spinous process tenderness present.      Thoracic back: Tenderness present.        Back:       Comments: Patient has pain to the bilateral elbows on the posterior aspect but has full range of motion.  Neurovascular intact distally.   Skin:     Comments: Patient with ecchymotic changes to both olecranon and posterior elbow area.   Neurological:      General: No focal deficit present.      Mental Status: He is alert and oriented to person, place, and time.      Cranial Nerves: No cranial nerve deficit.      Sensory: No sensory deficit.      Motor: No weakness.      Coordination: Coordination normal.   Psychiatric:         Mood and Affect: Mood normal.         Thought Content: Thought content normal.         Procedures           ED Course      Results for orders placed or performed during the hospital encounter of 12/19/23   Comprehensive Metabolic Panel    Specimen: Blood   Result Value Ref Range    Glucose 95 65 - 99 mg/dL    BUN 14 6 - 20 mg/dL    Creatinine 0.86 0.76 - 1.27 mg/dL    Sodium 142 136 - 145 mmol/L    Potassium 4.5 3.5 - 5.2 mmol/L    Chloride 105 98 - 107 mmol/L    CO2 26.0 22.0 - 29.0 mmol/L    Calcium 9.7 8.6 - 10.5 mg/dL    Total Protein 7.4 6.0 - 8.5 g/dL    Albumin 4.4 3.5 - 5.2 g/dL    ALT (SGPT) 29 1 - 41 U/L    AST (SGOT) 22 1 - 40 U/L    Alkaline Phosphatase 72 39 - 117 U/L    Total Bilirubin 0.2 0.0 - 1.2 mg/dL    Globulin 3.0 gm/dL    A/G Ratio 1.5 g/dL    BUN/Creatinine Ratio 16.3 7.0 - 25.0    Anion Gap 11.0 5.0 - 15.0 mmol/L    eGFR 106.8 >60.0 mL/min/1.73   Protime-INR    Specimen: Blood   Result Value Ref Range    Protime 9.9 9.6 - 11.7 Seconds    INR <0.93 (L) 0.93 - 1.10   aPTT    Specimen: Blood   Result Value Ref Range    PTT 23.7 (L) 61.0 - 76.5 seconds   Magnesium    Specimen: Blood   Result Value Ref Range    Magnesium 2.1 1.6 - 2.6  mg/dL   Urinalysis With Microscopic If Indicated (No Culture) - Urine, Clean Catch    Specimen: Urine, Clean Catch   Result Value Ref Range    Color, UA Yellow Yellow, Straw    Appearance, UA Clear Clear    pH, UA 5.5 5.0 - 8.0    Specific Gravity, UA 1.024 1.005 - 1.030    Glucose, UA Negative Negative    Ketones, UA Trace (A) Negative    Bilirubin, UA Negative Negative    Blood, UA Large (3+) (A) Negative    Protein, UA Negative Negative    Leuk Esterase, UA Negative Negative    Nitrite, UA Negative Negative    Urobilinogen, UA 1.0 E.U./dL 0.2 - 1.0 E.U./dL   Urine Drug Screen - Urine, Clean Catch    Specimen: Urine, Clean Catch   Result Value Ref Range    Amphet/Methamphet, Screen Negative Negative    Barbiturates Screen, Urine Negative Negative    Benzodiazepine Screen, Urine Negative Negative    Cocaine Screen, Urine Negative Negative    Opiate Screen Negative Negative    THC, Screen, Urine Positive (A) Negative    Methadone Screen, Urine Negative Negative    Oxycodone Screen, Urine Negative Negative   High Sensitivity Troponin T    Specimen: Blood   Result Value Ref Range    HS Troponin T <6 <22 ng/L   CBC Auto Differential    Specimen: Blood   Result Value Ref Range    WBC 8.30 3.40 - 10.80 10*3/mm3    RBC 5.26 4.14 - 5.80 10*6/mm3    Hemoglobin 16.1 13.0 - 17.7 g/dL    Hematocrit 48.6 37.5 - 51.0 %    MCV 92.4 79.0 - 97.0 fL    MCH 30.6 26.6 - 33.0 pg    MCHC 33.1 31.5 - 35.7 g/dL    RDW 13.6 12.3 - 15.4 %    RDW-SD 43.8 37.0 - 54.0 fl    MPV 8.1 6.0 - 12.0 fL    Platelets 277 140 - 450 10*3/mm3    Neutrophil % 70.1 42.7 - 76.0 %    Lymphocyte % 19.4 (L) 19.6 - 45.3 %    Monocyte % 8.6 5.0 - 12.0 %    Eosinophil % 1.1 0.3 - 6.2 %    Basophil % 0.8 0.0 - 1.5 %    Neutrophils, Absolute 5.80 1.70 - 7.00 10*3/mm3    Lymphocytes, Absolute 1.60 0.70 - 3.10 10*3/mm3    Monocytes, Absolute 0.70 0.10 - 0.90 10*3/mm3    Eosinophils, Absolute 0.10 0.00 - 0.40 10*3/mm3    Basophils, Absolute 0.10 0.00 - 0.20 10*3/mm3     nRBC 0.0 0.0 - 0.2 /100 WBC   Urinalysis, Microscopic Only - Urine, Clean Catch    Specimen: Urine, Clean Catch   Result Value Ref Range    RBC, UA 21-50 (A) None Seen, 0-2 /HPF    WBC, UA 0-2 None Seen, 0-2 /HPF    Bacteria, UA None Seen None Seen /HPF    Squamous Epithelial Cells, UA 0-2 None Seen, 0-2 /HPF    Hyaline Casts, UA None Seen None Seen /LPF    Methodology Automated Microscopy    High Sensitivity Troponin T 2Hr    Specimen: Blood   Result Value Ref Range    HS Troponin T <6 <22 ng/L    Troponin T Delta     ECG 12 Lead Syncope   Result Value Ref Range    QT Interval 351 ms    QTC Interval 407 ms                                CT Head Without Contrast    Result Date: 12/19/2023  No acute intracranial abnormality CT cervical: No significant spondylolisthesis. No evidence of focal lesions. The prevertebral soft tissues appear unremarkable. Surrounding soft tissues appear within normal limits. Mild to moderate multilevel degenerative changes are present throughout the spine. The lung apices appear clear Impression: No acute fractures or subluxations CT angiogram of the chest: Pulmonary arteries: Adequate opacification of the pulmonary arteries. No evidence of acute pulmonary embolism. Lungs and Pleura: The lungs are clear. A 4 mm nodule in the left upper lobe (image 56 series 5). There is a 6 mm nodule in the right upper lobe (image 62 series 5). A 4 mm subpleural nodule right middle lobe (image 75). Minimal bibasilar linear atelectasis. Otherwise clear lungs. No consolidation. No pleural fluid. Mediastinum/Nicole: No mediastinal or hilar lymphadenopathy. Lymph nodes: No axillary or supraclavicular adenopathy. Cardiovascular: The cardiac chambers are within normal limits. The pericardium is normal. The aorta and its arch branch vessels are unremarkable.  Upper Abdomen: Round hypodense right adrenal nodule compatible with an adenoma measuring 3.1 cm. Small right renal calculi. There is a 1 cm calculi in the  left renal pelvis with no hydronephrosis Bones and Soft Tissue: No suspicious osseous lesion. Impression: 1. No evidence of pulmonary embolism 2. Minimal bibasilar atelectasis 3. Few nonspecific pulmonary nodules measuring up to 6 mm. Follow-up CT chest in 6 months recommended 4. Few additional incidental findings in the upper abdomen as above Electronically Signed: Elian Hill MD  12/19/2023 11:21 AM EST  Workstation ID: [id number]    CT Cervical Spine Without Contrast    Result Date: 12/19/2023  No acute intracranial abnormality CT cervical: No significant spondylolisthesis. No evidence of focal lesions. The prevertebral soft tissues appear unremarkable. Surrounding soft tissues appear within normal limits. Mild to moderate multilevel degenerative changes are present throughout the spine. The lung apices appear clear Impression: No acute fractures or subluxations CT angiogram of the chest: Pulmonary arteries: Adequate opacification of the pulmonary arteries. No evidence of acute pulmonary embolism. Lungs and Pleura: The lungs are clear. A 4 mm nodule in the left upper lobe (image 56 series 5). There is a 6 mm nodule in the right upper lobe (image 62 series 5). A 4 mm subpleural nodule right middle lobe (image 75). Minimal bibasilar linear atelectasis. Otherwise clear lungs. No consolidation. No pleural fluid. Mediastinum/Nicole: No mediastinal or hilar lymphadenopathy. Lymph nodes: No axillary or supraclavicular adenopathy. Cardiovascular: The cardiac chambers are within normal limits. The pericardium is normal. The aorta and its arch branch vessels are unremarkable.  Upper Abdomen: Round hypodense right adrenal nodule compatible with an adenoma measuring 3.1 cm. Small right renal calculi. There is a 1 cm calculi in the left renal pelvis with no hydronephrosis Bones and Soft Tissue: No suspicious osseous lesion. Impression: 1. No evidence of pulmonary embolism 2. Minimal bibasilar atelectasis 3. Few nonspecific  pulmonary nodules measuring up to 6 mm. Follow-up CT chest in 6 months recommended 4. Few additional incidental findings in the upper abdomen as above Electronically Signed: Elian Hill MD  12/19/2023 11:21 AM EST  Workstation ID: [id number]    CT Angiogram Chest Pulmonary Embolism    Result Date: 12/19/2023  No acute intracranial abnormality CT cervical: No significant spondylolisthesis. No evidence of focal lesions. The prevertebral soft tissues appear unremarkable. Surrounding soft tissues appear within normal limits. Mild to moderate multilevel degenerative changes are present throughout the spine. The lung apices appear clear Impression: No acute fractures or subluxations CT angiogram of the chest: Pulmonary arteries: Adequate opacification of the pulmonary arteries. No evidence of acute pulmonary embolism. Lungs and Pleura: The lungs are clear. A 4 mm nodule in the left upper lobe (image 56 series 5). There is a 6 mm nodule in the right upper lobe (image 62 series 5). A 4 mm subpleural nodule right middle lobe (image 75). Minimal bibasilar linear atelectasis. Otherwise clear lungs. No consolidation. No pleural fluid. Mediastinum/Nicole: No mediastinal or hilar lymphadenopathy. Lymph nodes: No axillary or supraclavicular adenopathy. Cardiovascular: The cardiac chambers are within normal limits. The pericardium is normal. The aorta and its arch branch vessels are unremarkable.  Upper Abdomen: Round hypodense right adrenal nodule compatible with an adenoma measuring 3.1 cm. Small right renal calculi. There is a 1 cm calculi in the left renal pelvis with no hydronephrosis Bones and Soft Tissue: No suspicious osseous lesion. Impression: 1. No evidence of pulmonary embolism 2. Minimal bibasilar atelectasis 3. Few nonspecific pulmonary nodules measuring up to 6 mm. Follow-up CT chest in 6 months recommended 4. Few additional incidental findings in the upper abdomen as above Electronically Signed: Elian Hill MD   12/19/2023 11:21 AM EST  Workstation ID: [id number]    XR Elbow 2 View Bilateral    Result Date: 12/19/2023  Impression: Multiple views of the bilateral elbows were obtained. No acute fracture or dislocation is identified. Bony alignment appears unremarkable. No elbow effusions are seen. Soft tissues demonstrate no acute abnormality. Electronically Signed: Juventino Sumner MD  12/19/2023 10:27 AM EST  Workstation ID: [id number]               Medical Decision Making  Patient was seen and evaluated for the above problems    Differential diagnose includes but is not limited to syncope, concussion, intracerebral hemorrhage, skull fracture, cervical fracture, thoracic fracture, PE    Patient had initial EKG showing sinus rhythm rate of 81.  He described having alcoholic beverages and then collapsing.  He did strike his head and had significant loss of consciousness.  There is no intracerebral hemorrhage on CT.  There is no skull fracture.  No cervical fracture or thoracic fracture.  He did have pulmonary nodules seen on CT.  I discussed that he needs follow-up with him for risk of cancer.  He verbalized understanding is a smoker.  He has no signs of pulmonary embolism.  CT chest was obtained as well because he has been having intermittent fluttering sensations and will have a hard time taking deep breath.  This been persistent since his surgery.  He definitely had a concussion.  I discussed this with him.  Secondary to these palpitations and fluttering sensations as well as his unresponsive episode I recommended admission to the hospital.  He declines at this point time.  He understands I cannot fully rule out emergencies.  But at this point time he wants to follow-up outpatient.  He will be provided specialty consultation was instructed to return anytime if he has any changing of his mind or worsening symptoms signs or concerns.  For the palpitations patient was offered Holter monitor mobile cardiac monitoring.  He declines.   He states he will follow-up with cardiology.    Problems Addressed:  Concussion with loss of consciousness of 30 minutes or less, initial encounter: complicated acute illness or injury  Neck sprain, initial encounter: complicated acute illness or injury  Pulmonary nodule: complicated acute illness or injury  Syncope, unspecified syncope type: complicated acute illness or injury  Thoracic sprain: complicated acute illness or injury    Amount and/or Complexity of Data Reviewed  Labs: ordered. Decision-making details documented in ED Course.     Details: Labs reviewed by myself  Radiology: ordered and independent interpretation performed.     Details: CT scans reviewed by myself  ECG/medicine tests: ordered and independent interpretation performed.     Details: EKG interpreted by myself as above    Risk  Prescription drug management.  Decision regarding hospitalization.        Final diagnoses:   None   Head injury/concussion  Syncope  Cervical thoracic sprain  Dizziness  Bilateral elbow contusion  Pulmonary nodules    ED Disposition  ED Disposition       None            No follow-up provider specified.       Medication List      No changes were made to your prescriptions during this visit.            Marvin Ballesteros, DO  12/19/23 1341       Marvin Ballesteros, DO  12/19/23 1344       Marvin Ballesteros, DO  12/19/23 1350

## 2023-12-19 NOTE — DISCHARGE INSTRUCTIONS
You have declined mission to the hospital today.  I was requesting further cardiac testing as well as neurologic observation and testing.  Please return immediately to the emergency department if you have any further dizziness, chest pain, passing out or worsening symptoms signs or concerns.

## 2023-12-20 ENCOUNTER — APPOINTMENT (OUTPATIENT)
Dept: GENERAL RADIOLOGY | Facility: HOSPITAL | Age: 48
End: 2023-12-20
Payer: COMMERCIAL

## 2023-12-20 ENCOUNTER — HOSPITAL ENCOUNTER (OUTPATIENT)
Facility: HOSPITAL | Age: 48
Setting detail: OBSERVATION
Discharge: HOME | End: 2023-12-21
Attending: EMERGENCY MEDICINE | Admitting: EMERGENCY MEDICINE
Payer: COMMERCIAL

## 2023-12-20 ENCOUNTER — APPOINTMENT (OUTPATIENT)
Dept: CT IMAGING | Facility: HOSPITAL | Age: 48
End: 2023-12-20
Payer: COMMERCIAL

## 2023-12-20 DIAGNOSIS — R07.9 CHEST PAIN, UNSPECIFIED TYPE: Primary | ICD-10-CM

## 2023-12-20 DIAGNOSIS — N20.1 URETEROLITHIASIS: ICD-10-CM

## 2023-12-20 LAB
ALBUMIN SERPL-MCNC: 4.4 G/DL (ref 3.5–5.2)
ALBUMIN/GLOB SERPL: 1.8 G/DL
ALP SERPL-CCNC: 64 U/L (ref 39–117)
ALT SERPL W P-5'-P-CCNC: 24 U/L (ref 1–41)
ANION GAP SERPL CALCULATED.3IONS-SCNC: 9 MMOL/L (ref 5–15)
APTT PPP: 24.8 SECONDS (ref 61–76.5)
AST SERPL-CCNC: 16 U/L (ref 1–40)
BACTERIA UR QL AUTO: ABNORMAL /HPF
BASOPHILS # BLD AUTO: 0.1 10*3/MM3 (ref 0–0.2)
BASOPHILS NFR BLD AUTO: 0.7 % (ref 0–1.5)
BILIRUB SERPL-MCNC: 0.4 MG/DL (ref 0–1.2)
BILIRUB UR QL STRIP: NEGATIVE
BUN SERPL-MCNC: 13 MG/DL (ref 6–20)
BUN/CREAT SERPL: 15.9 (ref 7–25)
CALCIUM SPEC-SCNC: 9.4 MG/DL (ref 8.6–10.5)
CHLORIDE SERPL-SCNC: 106 MMOL/L (ref 98–107)
CHOLEST SERPL-MCNC: 177 MG/DL (ref 0–200)
CK SERPL-CCNC: 61 U/L (ref 20–200)
CLARITY UR: ABNORMAL
CO2 SERPL-SCNC: 28 MMOL/L (ref 22–29)
COLOR UR: ABNORMAL
CREAT SERPL-MCNC: 0.82 MG/DL (ref 0.76–1.27)
DEPRECATED RDW RBC AUTO: 43.8 FL (ref 37–54)
EGFRCR SERPLBLD CKD-EPI 2021: 108.4 ML/MIN/1.73
EOSINOPHIL # BLD AUTO: 0.1 10*3/MM3 (ref 0–0.4)
EOSINOPHIL NFR BLD AUTO: 1.1 % (ref 0.3–6.2)
ERYTHROCYTE [DISTWIDTH] IN BLOOD BY AUTOMATED COUNT: 13.6 % (ref 12.3–15.4)
GEN 5 2HR TROPONIN T REFLEX: <6 NG/L
GLOBULIN UR ELPH-MCNC: 2.4 GM/DL
GLUCOSE SERPL-MCNC: 88 MG/DL (ref 65–99)
GLUCOSE UR STRIP-MCNC: NEGATIVE MG/DL
HCT VFR BLD AUTO: 47.2 % (ref 37.5–51)
HDLC SERPL-MCNC: 69 MG/DL (ref 40–60)
HGB BLD-MCNC: 15.4 G/DL (ref 13–17.7)
HGB UR QL STRIP.AUTO: ABNORMAL
HYALINE CASTS UR QL AUTO: ABNORMAL /LPF
INR PPP: 0.94 (ref 0.93–1.1)
KETONES UR QL STRIP: NEGATIVE
LDLC SERPL CALC-MCNC: 90 MG/DL (ref 0–100)
LDLC/HDLC SERPL: 1.27 {RATIO}
LEUKOCYTE ESTERASE UR QL STRIP.AUTO: ABNORMAL
LYMPHOCYTES # BLD AUTO: 1.7 10*3/MM3 (ref 0.7–3.1)
LYMPHOCYTES NFR BLD AUTO: 22.5 % (ref 19.6–45.3)
MAGNESIUM SERPL-MCNC: 2 MG/DL (ref 1.6–2.6)
MCH RBC QN AUTO: 30.3 PG (ref 26.6–33)
MCHC RBC AUTO-ENTMCNC: 32.7 G/DL (ref 31.5–35.7)
MCV RBC AUTO: 92.8 FL (ref 79–97)
MONOCYTES # BLD AUTO: 0.8 10*3/MM3 (ref 0.1–0.9)
MONOCYTES NFR BLD AUTO: 10.7 % (ref 5–12)
NEUTROPHILS NFR BLD AUTO: 4.8 10*3/MM3 (ref 1.7–7)
NEUTROPHILS NFR BLD AUTO: 65 % (ref 42.7–76)
NITRITE UR QL STRIP: NEGATIVE
NRBC BLD AUTO-RTO: 0 /100 WBC (ref 0–0.2)
PH UR STRIP.AUTO: 8 [PH] (ref 5–8)
PLATELET # BLD AUTO: 266 10*3/MM3 (ref 140–450)
PMV BLD AUTO: 8.2 FL (ref 6–12)
POTASSIUM SERPL-SCNC: 4 MMOL/L (ref 3.5–5.2)
PROT SERPL-MCNC: 6.8 G/DL (ref 6–8.5)
PROT UR QL STRIP: NEGATIVE
PROTHROMBIN TIME: 10.3 SECONDS (ref 9.6–11.7)
QT INTERVAL: 351 MS
QTC INTERVAL: 407 MS
RBC # BLD AUTO: 5.08 10*6/MM3 (ref 4.14–5.8)
RBC # UR STRIP: ABNORMAL /HPF
REF LAB TEST METHOD: ABNORMAL
SODIUM SERPL-SCNC: 143 MMOL/L (ref 136–145)
SP GR UR STRIP: 1.02 (ref 1–1.03)
SQUAMOUS #/AREA URNS HPF: ABNORMAL /HPF
TRIGL SERPL-MCNC: 101 MG/DL (ref 0–150)
TROPONIN T DELTA: NORMAL
TROPONIN T SERPL HS-MCNC: <6 NG/L
UROBILINOGEN UR QL STRIP: ABNORMAL
VLDLC SERPL-MCNC: 18 MG/DL (ref 5–40)
WBC # UR STRIP: ABNORMAL /HPF
WBC NRBC COR # BLD AUTO: 7.4 10*3/MM3 (ref 3.4–10.8)

## 2023-12-20 PROCEDURE — 82550 ASSAY OF CK (CPK): CPT | Performed by: EMERGENCY MEDICINE

## 2023-12-20 PROCEDURE — 93005 ELECTROCARDIOGRAM TRACING: CPT

## 2023-12-20 PROCEDURE — G0378 HOSPITAL OBSERVATION PER HR: HCPCS

## 2023-12-20 PROCEDURE — 85025 COMPLETE CBC W/AUTO DIFF WBC: CPT | Performed by: EMERGENCY MEDICINE

## 2023-12-20 PROCEDURE — 99285 EMERGENCY DEPT VISIT HI MDM: CPT

## 2023-12-20 PROCEDURE — 83735 ASSAY OF MAGNESIUM: CPT | Performed by: EMERGENCY MEDICINE

## 2023-12-20 PROCEDURE — 81001 URINALYSIS AUTO W/SCOPE: CPT | Performed by: EMERGENCY MEDICINE

## 2023-12-20 PROCEDURE — 71045 X-RAY EXAM CHEST 1 VIEW: CPT

## 2023-12-20 PROCEDURE — 84484 ASSAY OF TROPONIN QUANT: CPT | Performed by: EMERGENCY MEDICINE

## 2023-12-20 PROCEDURE — 80061 LIPID PANEL: CPT | Performed by: PHYSICIAN ASSISTANT

## 2023-12-20 PROCEDURE — 85610 PROTHROMBIN TIME: CPT | Performed by: EMERGENCY MEDICINE

## 2023-12-20 PROCEDURE — 74177 CT ABD & PELVIS W/CONTRAST: CPT

## 2023-12-20 PROCEDURE — 36415 COLL VENOUS BLD VENIPUNCTURE: CPT

## 2023-12-20 PROCEDURE — 80053 COMPREHEN METABOLIC PANEL: CPT | Performed by: EMERGENCY MEDICINE

## 2023-12-20 PROCEDURE — 25510000001 IOPAMIDOL PER 1 ML: Performed by: EMERGENCY MEDICINE

## 2023-12-20 PROCEDURE — 93005 ELECTROCARDIOGRAM TRACING: CPT | Performed by: EMERGENCY MEDICINE

## 2023-12-20 PROCEDURE — 85730 THROMBOPLASTIN TIME PARTIAL: CPT | Performed by: EMERGENCY MEDICINE

## 2023-12-20 RX ORDER — AMOXICILLIN 250 MG
2 CAPSULE ORAL 2 TIMES DAILY
Status: DISCONTINUED | OUTPATIENT
Start: 2023-12-20 | End: 2023-12-21 | Stop reason: HOSPADM

## 2023-12-20 RX ORDER — BISACODYL 5 MG/1
5 TABLET, DELAYED RELEASE ORAL DAILY PRN
Status: DISCONTINUED | OUTPATIENT
Start: 2023-12-20 | End: 2023-12-21 | Stop reason: HOSPADM

## 2023-12-20 RX ORDER — ONDANSETRON 2 MG/ML
4 INJECTION INTRAMUSCULAR; INTRAVENOUS EVERY 6 HOURS PRN
Status: DISCONTINUED | OUTPATIENT
Start: 2023-12-20 | End: 2023-12-21 | Stop reason: HOSPADM

## 2023-12-20 RX ORDER — NITROGLYCERIN 0.4 MG/1
0.4 TABLET SUBLINGUAL
Status: DISCONTINUED | OUTPATIENT
Start: 2023-12-20 | End: 2023-12-21 | Stop reason: HOSPADM

## 2023-12-20 RX ORDER — SODIUM CHLORIDE 0.9 % (FLUSH) 0.9 %
10 SYRINGE (ML) INJECTION AS NEEDED
Status: DISCONTINUED | OUTPATIENT
Start: 2023-12-20 | End: 2023-12-21 | Stop reason: HOSPADM

## 2023-12-20 RX ORDER — ACETAMINOPHEN 650 MG/1
650 SUPPOSITORY RECTAL EVERY 4 HOURS PRN
Status: DISCONTINUED | OUTPATIENT
Start: 2023-12-20 | End: 2023-12-21 | Stop reason: HOSPADM

## 2023-12-20 RX ORDER — SODIUM CHLORIDE 9 MG/ML
40 INJECTION, SOLUTION INTRAVENOUS AS NEEDED
Status: DISCONTINUED | OUTPATIENT
Start: 2023-12-20 | End: 2023-12-21 | Stop reason: HOSPADM

## 2023-12-20 RX ORDER — ALUMINA, MAGNESIA, AND SIMETHICONE 2400; 2400; 240 MG/30ML; MG/30ML; MG/30ML
15 SUSPENSION ORAL EVERY 6 HOURS PRN
Status: DISCONTINUED | OUTPATIENT
Start: 2023-12-20 | End: 2023-12-21 | Stop reason: HOSPADM

## 2023-12-20 RX ORDER — ACETAMINOPHEN 160 MG/5ML
650 SOLUTION ORAL EVERY 4 HOURS PRN
Status: DISCONTINUED | OUTPATIENT
Start: 2023-12-20 | End: 2023-12-21 | Stop reason: HOSPADM

## 2023-12-20 RX ORDER — SODIUM CHLORIDE 0.9 % (FLUSH) 0.9 %
10 SYRINGE (ML) INJECTION EVERY 12 HOURS SCHEDULED
Status: DISCONTINUED | OUTPATIENT
Start: 2023-12-20 | End: 2023-12-21 | Stop reason: HOSPADM

## 2023-12-20 RX ORDER — BISACODYL 10 MG
10 SUPPOSITORY, RECTAL RECTAL DAILY PRN
Status: DISCONTINUED | OUTPATIENT
Start: 2023-12-20 | End: 2023-12-21 | Stop reason: HOSPADM

## 2023-12-20 RX ORDER — ACETAMINOPHEN 325 MG/1
650 TABLET ORAL EVERY 4 HOURS PRN
Status: DISCONTINUED | OUTPATIENT
Start: 2023-12-20 | End: 2023-12-21 | Stop reason: HOSPADM

## 2023-12-20 RX ORDER — POLYETHYLENE GLYCOL 3350 17 G/17G
17 POWDER, FOR SOLUTION ORAL DAILY PRN
Status: DISCONTINUED | OUTPATIENT
Start: 2023-12-20 | End: 2023-12-21 | Stop reason: HOSPADM

## 2023-12-20 RX ORDER — CHOLECALCIFEROL (VITAMIN D3) 125 MCG
5 CAPSULE ORAL NIGHTLY PRN
Status: DISCONTINUED | OUTPATIENT
Start: 2023-12-20 | End: 2023-12-21 | Stop reason: HOSPADM

## 2023-12-20 RX ORDER — OMEPRAZOLE 40 MG/1
40 CAPSULE, DELAYED RELEASE ORAL DAILY
COMMUNITY
End: 2024-01-05 | Stop reason: SDUPTHER

## 2023-12-20 RX ORDER — ONDANSETRON 4 MG/1
4 TABLET, FILM COATED ORAL EVERY 6 HOURS PRN
Status: DISCONTINUED | OUTPATIENT
Start: 2023-12-20 | End: 2023-12-21 | Stop reason: HOSPADM

## 2023-12-20 RX ADMIN — ACETAMINOPHEN 650 MG: 325 TABLET, FILM COATED ORAL at 21:42

## 2023-12-20 RX ADMIN — Medication 10 ML: at 21:46

## 2023-12-20 RX ADMIN — IOPAMIDOL 100 ML: 755 INJECTION, SOLUTION INTRAVENOUS at 14:51

## 2023-12-20 NOTE — ED PROVIDER NOTES
Subjective   History of Present Illness  Chief complaint: Patient is a 48-year-old who was seen yesterday.  He has a fall while intoxicated and had a concussion.  He was having some upper back pain as well.  He lost consciousness for prolonged period of time and had been having intermittent chest discomfort and fluttering sensation to his throat for months.  He states that he woke up this morning and he urinated dark urine.  He had some sharp pain in his chest and felt that sensation in his neck again.  He has not been urinating any gross blood.  But his things returned and now the change in his urine he presented here for further evaluation.  He is not having any flank pain.  He is not having any abdominal pain.  No nausea or vomiting.    Context:    Duration:    Timing: Acute sudden onset    Severity:    Associated Symptoms:        PCP:  LMP:      Review of Systems   Constitutional:  Negative for fever.   Respiratory:  Negative for shortness of breath.    Cardiovascular:  Positive for chest pain and palpitations.   Gastrointestinal:  Negative for abdominal pain.   Genitourinary:         Dark urine       Past Medical History:   Diagnosis Date    Acute midline low back pain without sciatica     Impression: He was given Toradol 60mg IM inj and Depo/Decadron IM inj. He was started on Prednisone, Ibuprofen, and Zanaflex.    Chicken pox     Elevated cholesterol     GERD (gastroesophageal reflux disease)     Hernia, abdominal     Hyperlipidemia     Hypertension     Psoriasis     Shingles     Impression: He will be started on Acyclovir, Prednisone and Gabapentin to treat his pain. He was encouraged to RTC if his symptoms did not improve.    Tobacco use disorder     Umbilical hernia        Allergies   Allergen Reactions    Bee Venom Anaphylaxis     anaphylactic reaction to a bee sting    Pollen Extract Unknown - Low Severity       Past Surgical History:   Procedure Laterality Date    COLONOSCOPY N/A 04/19/2023     Procedure: COLONOSCOPY FOR SCREENING with polypectomy;  Surgeon: Eduardo Grande MD;  [facility];  Service: Gastroenterology;  Laterality: N/A;  Ascending Polyp, Cecal Polyp, Hemorrhoids    EAR TUBES      HERNIA REPAIR Bilateral     Inguinal    UMBILICAL HERNIA REPAIR N/A 11/17/2023    Procedure: UMBILICAL HERNIA REPAIR;  Surgeon: Bay Cooper MD;  Location:  [facility];  Service: General;  Laterality: N/A;    VASECTOMY         Family History   Problem Relation Age of Onset    Coronary artery disease Mother     Hypertension Mother     Coronary artery disease Father     Hypertension Father     Malig Hyperthermia Neg Hx        Social History     Socioeconomic History    Marital status:    Tobacco Use    Smoking status: Some Days     Packs/day: 0.25     Years: 15.00     Additional pack years: 0.00     Total pack years: 3.75     Types: Cigarettes    Smokeless tobacco: Never   Vaping Use    Vaping Use: Never used   Substance and Sexual Activity    Alcohol use: Yes     Comment: SOCIAL USE ONLY    Drug use: Yes     Types: Marijuana     Comment: 2 TIMES PER MONTH    Sexual activity: Yes     Partners: Female           Objective   Physical Exam  Vitals and nursing note reviewed.   Constitutional:       Appearance: He is well-developed.   HENT:      Head: Normocephalic and atraumatic.   Cardiovascular:      Rate and Rhythm: Normal rate and regular rhythm.      Heart sounds: Normal heart sounds.   Pulmonary:      Effort: Pulmonary effort is normal.      Breath sounds: Normal breath sounds.   Abdominal:      Palpations: Abdomen is soft.      Tenderness: There is no abdominal tenderness.   Musculoskeletal:      Right lower leg: No tenderness. No edema.      Left lower leg: No tenderness. No edema.   Neurological:      General: No focal deficit present.      Mental Status: He is alert and oriented to person, place, and time.   Psychiatric:         Mood and Affect: Mood normal.         Procedures            ED Course                                 Results for orders placed or performed during the hospital encounter of 12/20/23   Comprehensive Metabolic Panel    Specimen: Blood   Result Value Ref Range    Glucose 88 65 - 99 mg/dL    BUN 13 6 - 20 mg/dL    Creatinine 0.82 0.76 - 1.27 mg/dL    Sodium 143 136 - 145 mmol/L    Potassium 4.0 3.5 - 5.2 mmol/L    Chloride 106 98 - 107 mmol/L    CO2 28.0 22.0 - 29.0 mmol/L    Calcium 9.4 8.6 - 10.5 mg/dL    Total Protein 6.8 6.0 - 8.5 g/dL    Albumin 4.4 3.5 - 5.2 g/dL    ALT (SGPT) 24 1 - 41 U/L    AST (SGOT) 16 1 - 40 U/L    Alkaline Phosphatase 64 39 - 117 U/L    Total Bilirubin 0.4 0.0 - 1.2 mg/dL    Globulin 2.4 gm/dL    A/G Ratio 1.8 g/dL    BUN/Creatinine Ratio 15.9 7.0 - 25.0    Anion Gap 9.0 5.0 - 15.0 mmol/L    eGFR 108.4 >60.0 mL/min/1.73   Protime-INR    Specimen: Blood   Result Value Ref Range    Protime 10.3 9.6 - 11.7 Seconds    INR 0.94 0.93 - 1.10   aPTT    Specimen: Blood   Result Value Ref Range    PTT 24.8 (L) 61.0 - 76.5 seconds   High Sensitivity Troponin T    Specimen: Blood   Result Value Ref Range    HS Troponin T <6 <22 ng/L   Magnesium    Specimen: Blood   Result Value Ref Range    Magnesium 2.0 1.6 - 2.6 mg/dL   CBC Auto Differential    Specimen: Blood   Result Value Ref Range    WBC 7.40 3.40 - 10.80 10*3/mm3    RBC 5.08 4.14 - 5.80 10*6/mm3    Hemoglobin 15.4 13.0 - 17.7 g/dL    Hematocrit 47.2 37.5 - 51.0 %    MCV 92.8 79.0 - 97.0 fL    MCH 30.3 26.6 - 33.0 pg    MCHC 32.7 31.5 - 35.7 g/dL    RDW 13.6 12.3 - 15.4 %    RDW-SD 43.8 37.0 - 54.0 fl    MPV 8.2 6.0 - 12.0 fL    Platelets 266 140 - 450 10*3/mm3    Neutrophil % 65.0 42.7 - 76.0 %    Lymphocyte % 22.5 19.6 - 45.3 %    Monocyte % 10.7 5.0 - 12.0 %    Eosinophil % 1.1 0.3 - 6.2 %    Basophil % 0.7 0.0 - 1.5 %    Neutrophils, Absolute 4.80 1.70 - 7.00 10*3/mm3    Lymphocytes, Absolute 1.70 0.70 - 3.10 10*3/mm3    Monocytes, Absolute 0.80 0.10 - 0.90 10*3/mm3    Eosinophils, Absolute  0.10 0.00 - 0.40 10*3/mm3    Basophils, Absolute 0.10 0.00 - 0.20 10*3/mm3    nRBC 0.0 0.0 - 0.2 /100 WBC   Urinalysis With Microscopic If Indicated (No Culture) - Urine, Clean Catch    Specimen: Urine, Clean Catch   Result Value Ref Range    Color, UA Dark Yellow (A) Yellow, Straw    Appearance, UA Cloudy (A) Clear    pH, UA 8.0 5.0 - 8.0    Specific Gravity, UA 1.022 1.005 - 1.030    Glucose, UA Negative Negative    Ketones, UA Negative Negative    Bilirubin, UA Negative Negative    Blood, UA Large (3+) (A) Negative    Protein, UA Negative Negative    Leuk Esterase, UA Trace (A) Negative    Nitrite, UA Negative Negative    Urobilinogen, UA 1.0 E.U./dL 0.2 - 1.0 E.U./dL   CK    Specimen: Blood   Result Value Ref Range    Creatine Kinase 61 20 - 200 U/L   Urinalysis, Microscopic Only - Urine, Clean Catch    Specimen: Urine, Clean Catch   Result Value Ref Range    RBC, UA Too Numerous to Count (A) None Seen, 0-2 /HPF    WBC, UA 0-2 None Seen, 0-2 /HPF    Bacteria, UA None Seen None Seen /HPF    Squamous Epithelial Cells, UA 0-2 None Seen, 0-2 /HPF    Hyaline Casts, UA None Seen None Seen /LPF    Methodology Automated Microscopy    High Sensitivity Troponin T 2Hr    Specimen: Blood   Result Value Ref Range    HS Troponin T <6 <22 ng/L    Troponin T Delta     ECG 12 Lead Chest Pain   Result Value Ref Range    QT Interval 361 ms    QTC Interval 386 ms               CT Abdomen Pelvis With Contrast    Result Date: 12/20/2023  Impression: 1.No acute abnormality identified within the abdomen or pelvis. 2.Bilateral nonobstructive nephrolithiasis, including a 10 mm nonobstructing calculus within the left renal pelvis. Urology consultation may be beneficial. 3.Colonic diverticulosis. 4.3 cm right adrenal mass, incompletely characterized on this contrast-enhanced study. This demonstrated a low CT density of approximately 10 Hounsfield units on the accompanying chest CTA. Suspect an adenoma. Follow-up adrenal protocol CT may be  beneficial. 5.Additional findings as detailed above. Electronically Signed: Juventino Sumner MD  12/20/2023 3:04 PM EST  Workstation ID: [id number]    XR Chest 1 View    Result Date: 12/20/2023  Impression: No acute chest finding. Electronically Signed: Lata Miguel MD  12/20/2023 12:44 PM EST  Workstation ID: [id number]    CT Head Without Contrast    Result Date: 12/19/2023  No acute intracranial abnormality CT cervical: No significant spondylolisthesis. No evidence of focal lesions. The prevertebral soft tissues appear unremarkable. Surrounding soft tissues appear within normal limits. Mild to moderate multilevel degenerative changes are present throughout the spine. The lung apices appear clear Impression: No acute fractures or subluxations CT angiogram of the chest: Pulmonary arteries: Adequate opacification of the pulmonary arteries. No evidence of acute pulmonary embolism. Lungs and Pleura: The lungs are clear. A 4 mm nodule in the left upper lobe (image 56 series 5). There is a 6 mm nodule in the right upper lobe (image 62 series 5). A 4 mm subpleural nodule right middle lobe (image 75). Minimal bibasilar linear atelectasis. Otherwise clear lungs. No consolidation. No pleural fluid. Mediastinum/Nicole: No mediastinal or hilar lymphadenopathy. Lymph nodes: No axillary or supraclavicular adenopathy. Cardiovascular: The cardiac chambers are within normal limits. The pericardium is normal. The aorta and its arch branch vessels are unremarkable.  Upper Abdomen: Round hypodense right adrenal nodule compatible with an adenoma measuring 3.1 cm. Small right renal calculi. There is a 1 cm calculi in the left renal pelvis with no hydronephrosis Bones and Soft Tissue: No suspicious osseous lesion. Impression: 1. No evidence of pulmonary embolism 2. Minimal bibasilar atelectasis 3. Few nonspecific pulmonary nodules measuring up to 6 mm. Follow-up CT chest in 6 months recommended 4. Few additional incidental findings in the  upper abdomen as above Electronically Signed: Elian Hill MD  12/19/2023 11:21 AM EST  Workstation ID: [id number]    CT Cervical Spine Without Contrast    Result Date: 12/19/2023  No acute intracranial abnormality CT cervical: No significant spondylolisthesis. No evidence of focal lesions. The prevertebral soft tissues appear unremarkable. Surrounding soft tissues appear within normal limits. Mild to moderate multilevel degenerative changes are present throughout the spine. The lung apices appear clear Impression: No acute fractures or subluxations CT angiogram of the chest: Pulmonary arteries: Adequate opacification of the pulmonary arteries. No evidence of acute pulmonary embolism. Lungs and Pleura: The lungs are clear. A 4 mm nodule in the left upper lobe (image 56 series 5). There is a 6 mm nodule in the right upper lobe (image 62 series 5). A 4 mm subpleural nodule right middle lobe (image 75). Minimal bibasilar linear atelectasis. Otherwise clear lungs. No consolidation. No pleural fluid. Mediastinum/Nicole: No mediastinal or hilar lymphadenopathy. Lymph nodes: No axillary or supraclavicular adenopathy. Cardiovascular: The cardiac chambers are within normal limits. The pericardium is normal. The aorta and its arch branch vessels are unremarkable.  Upper Abdomen: Round hypodense right adrenal nodule compatible with an adenoma measuring 3.1 cm. Small right renal calculi. There is a 1 cm calculi in the left renal pelvis with no hydronephrosis Bones and Soft Tissue: No suspicious osseous lesion. Impression: 1. No evidence of pulmonary embolism 2. Minimal bibasilar atelectasis 3. Few nonspecific pulmonary nodules measuring up to 6 mm. Follow-up CT chest in 6 months recommended 4. Few additional incidental findings in the upper abdomen as above Electronically Signed: Elian Hill MD  12/19/2023 11:21 AM EST  Workstation ID: [id number]    CT Angiogram Chest Pulmonary Embolism    Result Date: 12/19/2023  No acute  intracranial abnormality CT cervical: No significant spondylolisthesis. No evidence of focal lesions. The prevertebral soft tissues appear unremarkable. Surrounding soft tissues appear within normal limits. Mild to moderate multilevel degenerative changes are present throughout the spine. The lung apices appear clear Impression: No acute fractures or subluxations CT angiogram of the chest: Pulmonary arteries: Adequate opacification of the pulmonary arteries. No evidence of acute pulmonary embolism. Lungs and Pleura: The lungs are clear. A 4 mm nodule in the left upper lobe (image 56 series 5). There is a 6 mm nodule in the right upper lobe (image 62 series 5). A 4 mm subpleural nodule right middle lobe (image 75). Minimal bibasilar linear atelectasis. Otherwise clear lungs. No consolidation. No pleural fluid. Mediastinum/Nicole: No mediastinal or hilar lymphadenopathy. Lymph nodes: No axillary or supraclavicular adenopathy. Cardiovascular: The cardiac chambers are within normal limits. The pericardium is normal. The aorta and its arch branch vessels are unremarkable.  Upper Abdomen: Round hypodense right adrenal nodule compatible with an adenoma measuring 3.1 cm. Small right renal calculi. There is a 1 cm calculi in the left renal pelvis with no hydronephrosis Bones and Soft Tissue: No suspicious osseous lesion. Impression: 1. No evidence of pulmonary embolism 2. Minimal bibasilar atelectasis 3. Few nonspecific pulmonary nodules measuring up to 6 mm. Follow-up CT chest in 6 months recommended 4. Few additional incidental findings in the upper abdomen as above Electronically Signed: Elian Hill MD  12/19/2023 11:21 AM EST  Workstation ID: [id number]    XR Elbow 2 View Bilateral    Result Date: 12/19/2023  Impression: Multiple views of the bilateral elbows were obtained. No acute fracture or dislocation is identified. Bony alignment appears unremarkable. No elbow effusions are seen. Soft tissues demonstrate no acute  abnormality. Electronically Signed: Juventino Sumner MD  12/19/2023 10:27 AM EST  Workstation ID: [id number]       Medical Decision Making  Patient was seen and evaluated for the above problem    Differential diagnosis includes but is not limited to liver injury, ureterolithiasis, ACS, PE    Patient was here yesterday and had CT scans obtained after he had had some alcohol and collapsed to the ground.  He was diagnosed with concussion.  But he has been having these intermittent chest discomfort scenarios where he felt palpitations and irregularities with his heartbeat.  He has never had further cardiac workup.  He declined to stay yesterday however today he is willing to stay.  His EKG` interpreted by myself shows sinus rhythm rate of 69.  His troponin was normal.  He had CT chest PE study that was negative for pulmonary embolism yesterday.  However he had dark urine this morning and it was significant of blood on his urinalysis today.  Secondary to this I was concerned about liver injury with the trauma that he had the day prior.  CT scan of his abdomen pelvis was ordered.  No signs of liver injury.  There is a 10 mm stone in the left UPJ.  Likely causing his hematuria.  Patient will be placed in the ED observation unit for further cardiac testing and urologic management.  Discussed with Bossman on-call for the observation unit who agrees to take the patient.  Patient verbalized understanding of admission.    Problems Addressed:  Chest pain, unspecified type: complicated acute illness or injury    Amount and/or Complexity of Data Reviewed  Labs: ordered. Decision-making details documented in ED Course.     Details: Labs reviewed by myself  Radiology: ordered and independent interpretation performed.     Details: CT scan reviewed by myself.  ECG/medicine tests: ordered and independent interpretation performed.     Details: EKG interpreted by myself as above    Risk  Prescription drug management.  Decision regarding  hospitalization.        Final diagnoses:   None     Chest pain  Ureterolithiasis    ED Disposition  ED Disposition       None            No follow-up provider specified.       Medication List      No changes were made to your prescriptions during this visit.            Marvin Ballesteros, DO  12/20/23 1620       Marvin Ballesteros,   12/20/23 1621

## 2023-12-20 NOTE — ED NOTES
UA attempted, pt unable to provide UA at time of request.  Pt is drinking fluids at the current time.

## 2023-12-21 ENCOUNTER — READMISSION MANAGEMENT (OUTPATIENT)
Dept: CALL CENTER | Facility: HOSPITAL | Age: 48
End: 2023-12-21
Payer: COMMERCIAL

## 2023-12-21 ENCOUNTER — APPOINTMENT (OUTPATIENT)
Dept: NUCLEAR MEDICINE | Facility: HOSPITAL | Age: 48
End: 2023-12-21
Payer: COMMERCIAL

## 2023-12-21 ENCOUNTER — APPOINTMENT (OUTPATIENT)
Dept: RESPIRATORY THERAPY | Facility: HOSPITAL | Age: 48
End: 2023-12-21
Payer: COMMERCIAL

## 2023-12-21 VITALS
HEART RATE: 71 BPM | DIASTOLIC BLOOD PRESSURE: 100 MMHG | WEIGHT: 184.97 LBS | SYSTOLIC BLOOD PRESSURE: 155 MMHG | OXYGEN SATURATION: 99 % | TEMPERATURE: 98.1 F | BODY MASS INDEX: 25.05 KG/M2 | HEIGHT: 72 IN | RESPIRATION RATE: 13 BRPM

## 2023-12-21 LAB
ANION GAP SERPL CALCULATED.3IONS-SCNC: 10 MMOL/L (ref 5–15)
BASOPHILS # BLD AUTO: 0 10*3/MM3 (ref 0–0.2)
BASOPHILS NFR BLD AUTO: 0.5 % (ref 0–1.5)
BH CV REST NUCLEAR ISOTOPE DOSE: 11 MCI
BH CV STRESS BP STAGE 1: NORMAL
BH CV STRESS BP STAGE 2: NORMAL
BH CV STRESS COMMENTS STAGE 1: NORMAL
BH CV STRESS COMMENTS STAGE 2: NORMAL
BH CV STRESS DOSE REGADENOSON STAGE 1: 0.4
BH CV STRESS DURATION MIN STAGE 1: 0
BH CV STRESS DURATION MIN STAGE 2: 4
BH CV STRESS DURATION SEC STAGE 1: 10
BH CV STRESS DURATION SEC STAGE 2: 0
BH CV STRESS HR STAGE 1: 117
BH CV STRESS HR STAGE 2: 97
BH CV STRESS NUCLEAR ISOTOPE DOSE: 30 MCI
BH CV STRESS PROTOCOL 1: NORMAL
BH CV STRESS RECOVERY BP: NORMAL MMHG
BH CV STRESS RECOVERY HR: 97 BPM
BH CV STRESS STAGE 1: 1
BH CV STRESS STAGE 2: 2
BUN SERPL-MCNC: 13 MG/DL (ref 6–20)
BUN/CREAT SERPL: 15.1 (ref 7–25)
CALCIUM SPEC-SCNC: 9.1 MG/DL (ref 8.6–10.5)
CHLORIDE SERPL-SCNC: 104 MMOL/L (ref 98–107)
CO2 SERPL-SCNC: 25 MMOL/L (ref 22–29)
CREAT SERPL-MCNC: 0.86 MG/DL (ref 0.76–1.27)
DEPRECATED RDW RBC AUTO: 44.2 FL (ref 37–54)
EGFRCR SERPLBLD CKD-EPI 2021: 106.8 ML/MIN/1.73
EOSINOPHIL # BLD AUTO: 0.2 10*3/MM3 (ref 0–0.4)
EOSINOPHIL NFR BLD AUTO: 2.6 % (ref 0.3–6.2)
ERYTHROCYTE [DISTWIDTH] IN BLOOD BY AUTOMATED COUNT: 13.5 % (ref 12.3–15.4)
GLUCOSE SERPL-MCNC: 112 MG/DL (ref 65–99)
HCT VFR BLD AUTO: 46.8 % (ref 37.5–51)
HGB BLD-MCNC: 15.1 G/DL (ref 13–17.7)
LV EF NUC BP: 62 %
LYMPHOCYTES # BLD AUTO: 2.2 10*3/MM3 (ref 0.7–3.1)
LYMPHOCYTES NFR BLD AUTO: 33.2 % (ref 19.6–45.3)
MAGNESIUM SERPL-MCNC: 2.1 MG/DL (ref 1.6–2.6)
MAXIMAL PREDICTED HEART RATE: 172 BPM
MCH RBC QN AUTO: 30.2 PG (ref 26.6–33)
MCHC RBC AUTO-ENTMCNC: 32.2 G/DL (ref 31.5–35.7)
MCV RBC AUTO: 93.8 FL (ref 79–97)
MONOCYTES # BLD AUTO: 0.8 10*3/MM3 (ref 0.1–0.9)
MONOCYTES NFR BLD AUTO: 11.7 % (ref 5–12)
NEUTROPHILS NFR BLD AUTO: 3.4 10*3/MM3 (ref 1.7–7)
NEUTROPHILS NFR BLD AUTO: 52 % (ref 42.7–76)
NRBC BLD AUTO-RTO: 0 /100 WBC (ref 0–0.2)
PERCENT MAX PREDICTED HR: 68.02 %
PLATELET # BLD AUTO: 263 10*3/MM3 (ref 140–450)
PMV BLD AUTO: 8.2 FL (ref 6–12)
POTASSIUM SERPL-SCNC: 3.8 MMOL/L (ref 3.5–5.2)
QT INTERVAL: 361 MS
QTC INTERVAL: 386 MS
RBC # BLD AUTO: 4.99 10*6/MM3 (ref 4.14–5.8)
SODIUM SERPL-SCNC: 139 MMOL/L (ref 136–145)
STRESS BASELINE BP: NORMAL MMHG
STRESS BASELINE HR: 81 BPM
STRESS PERCENT HR: 80 %
STRESS POST PEAK BP: NORMAL MMHG
STRESS POST PEAK HR: 117 BPM
STRESS TARGET HR: 146 BPM
WBC NRBC COR # BLD AUTO: 6.6 10*3/MM3 (ref 3.4–10.8)

## 2023-12-21 PROCEDURE — G0378 HOSPITAL OBSERVATION PER HR: HCPCS

## 2023-12-21 PROCEDURE — 93017 CV STRESS TEST TRACING ONLY: CPT

## 2023-12-21 PROCEDURE — 78452 HT MUSCLE IMAGE SPECT MULT: CPT | Performed by: INTERNAL MEDICINE

## 2023-12-21 PROCEDURE — 80048 BASIC METABOLIC PNL TOTAL CA: CPT | Performed by: PHYSICIAN ASSISTANT

## 2023-12-21 PROCEDURE — 93005 ELECTROCARDIOGRAM TRACING: CPT | Performed by: PHYSICIAN ASSISTANT

## 2023-12-21 PROCEDURE — 83735 ASSAY OF MAGNESIUM: CPT | Performed by: PHYSICIAN ASSISTANT

## 2023-12-21 PROCEDURE — 85025 COMPLETE CBC W/AUTO DIFF WBC: CPT | Performed by: PHYSICIAN ASSISTANT

## 2023-12-21 PROCEDURE — 25010000002 REGADENOSON 0.4 MG/5ML SOLUTION: Performed by: PHYSICIAN ASSISTANT

## 2023-12-21 PROCEDURE — 93016 CV STRESS TEST SUPVJ ONLY: CPT

## 2023-12-21 PROCEDURE — 78452 HT MUSCLE IMAGE SPECT MULT: CPT

## 2023-12-21 PROCEDURE — 93246 EXT ECG>7D<15D RECORDING: CPT

## 2023-12-21 PROCEDURE — 93018 CV STRESS TEST I&R ONLY: CPT | Performed by: INTERNAL MEDICINE

## 2023-12-21 PROCEDURE — 0 TECHNETIUM TETROFOSMIN KIT: Performed by: PHYSICIAN ASSISTANT

## 2023-12-21 PROCEDURE — A9502 TC99M TETROFOSMIN: HCPCS | Performed by: PHYSICIAN ASSISTANT

## 2023-12-21 PROCEDURE — 93010 ELECTROCARDIOGRAM REPORT: CPT | Performed by: INTERNAL MEDICINE

## 2023-12-21 RX ORDER — MECLIZINE HYDROCHLORIDE 25 MG/1
25 TABLET ORAL ONCE
Status: COMPLETED | OUTPATIENT
Start: 2023-12-21 | End: 2023-12-21

## 2023-12-21 RX ORDER — ATORVASTATIN CALCIUM 40 MG/1
40 TABLET, FILM COATED ORAL DAILY
Status: DISCONTINUED | OUTPATIENT
Start: 2023-12-21 | End: 2023-12-21 | Stop reason: HOSPADM

## 2023-12-21 RX ORDER — AMLODIPINE BESYLATE 5 MG/1
10 TABLET ORAL DAILY
Status: DISCONTINUED | OUTPATIENT
Start: 2023-12-21 | End: 2023-12-21 | Stop reason: HOSPADM

## 2023-12-21 RX ORDER — REGADENOSON 0.08 MG/ML
0.4 INJECTION, SOLUTION INTRAVENOUS
Status: COMPLETED | OUTPATIENT
Start: 2023-12-21 | End: 2023-12-21

## 2023-12-21 RX ORDER — PANTOPRAZOLE SODIUM 40 MG/1
40 TABLET, DELAYED RELEASE ORAL
Status: DISCONTINUED | OUTPATIENT
Start: 2023-12-21 | End: 2023-12-21 | Stop reason: HOSPADM

## 2023-12-21 RX ADMIN — TETROFOSMIN 1 DOSE: 1.38 INJECTION, POWDER, LYOPHILIZED, FOR SOLUTION INTRAVENOUS at 08:43

## 2023-12-21 RX ADMIN — REGADENOSON 0.4 MG: 0.08 INJECTION, SOLUTION INTRAVENOUS at 08:43

## 2023-12-21 RX ADMIN — PANTOPRAZOLE SODIUM 40 MG: 40 TABLET, DELAYED RELEASE ORAL at 09:16

## 2023-12-21 RX ADMIN — DOCUSATE SODIUM 50MG AND SENNOSIDES 8.6MG 2 TABLET: 8.6; 5 TABLET, FILM COATED ORAL at 09:16

## 2023-12-21 RX ADMIN — AMLODIPINE BESYLATE 10 MG: 5 TABLET ORAL at 09:16

## 2023-12-21 RX ADMIN — ATORVASTATIN CALCIUM 40 MG: 40 TABLET, FILM COATED ORAL at 09:16

## 2023-12-21 RX ADMIN — Medication 10 ML: at 09:16

## 2023-12-21 RX ADMIN — TETROFOSMIN 1 DOSE: 1.38 INJECTION, POWDER, LYOPHILIZED, FOR SOLUTION INTRAVENOUS at 07:04

## 2023-12-21 RX ADMIN — MECLIZINE HYDROCHLORIDE 25 MG: 25 TABLET ORAL at 09:33

## 2023-12-21 NOTE — PLAN OF CARE
Problem: Adult Inpatient Plan of Care  Goal: Absence of Hospital-Acquired Illness or Injury  Intervention: Identify and Manage Fall Risk  Recent Flowsheet Documentation  Taken 12/21/2023 1200 by Chacha Lozoya RN  Safety Promotion/Fall Prevention: safety round/check completed  Taken 12/21/2023 1000 by Chacha Lozoya RN  Safety Promotion/Fall Prevention: safety round/check completed  Taken 12/21/2023 0916 by Chacha Lozoya RN  Safety Promotion/Fall Prevention: safety round/check completed  Taken 12/21/2023 0748 by Chacha Lozoya RN  Safety Promotion/Fall Prevention: patient off unit  Intervention: Prevent Skin Injury  Recent Flowsheet Documentation  Taken 12/21/2023 1200 by Chacha Lozoya RN  Body Position: position changed independently  Taken 12/21/2023 0916 by Chacha Lozoya RN  Body Position: position changed independently  Intervention: Prevent and Manage VTE (Venous Thromboembolism) Risk  Recent Flowsheet Documentation  Taken 12/21/2023 0916 by Chacha Lozoya RN  Activity Management: dorsiflexion/plantar flexion performed  Range of Motion: active ROM (range of motion) encouraged  Goal: Optimal Comfort and Wellbeing  Intervention: Provide Person-Centered Care  Recent Flowsheet Documentation  Taken 12/21/2023 0916 by Chacha Lozoya RN  Trust Relationship/Rapport:   care explained   choices provided   emotional support provided   questions answered   empathic listening provided   questions encouraged   reassurance provided   thoughts/feelings acknowledged     Problem: Fall Injury Risk  Goal: Absence of Fall and Fall-Related Injury  Intervention: Identify and Manage Contributors  Recent Flowsheet Documentation  Taken 12/21/2023 0916 by Chacha Lozoya RN  Medication Review/Management: medications reviewed  Intervention: Promote Injury-Free Environment  Recent Flowsheet Documentation  Taken 12/21/2023 1200 by Chcaha Lozoya RN  Safety Promotion/Fall Prevention:  safety round/check completed  Taken 12/21/2023 1000 by Chacha Lozoya, RN  Safety Promotion/Fall Prevention: safety round/check completed  Taken 12/21/2023 0916 by Chacha Lozoya, RN  Safety Promotion/Fall Prevention: safety round/check completed  Taken 12/21/2023 0748 by Chacha Lozoya, RN  Safety Promotion/Fall Prevention: patient off unit   Goal Outcome Evaluation:            Patient to discharge home

## 2023-12-21 NOTE — DISCHARGE SUMMARY
[facility]    Alexey Box Sr., MD    CHIEF COMPLAINT:     Hematuria and chest pain    HISTORY OF PRESENT ILLNESS:    Obtained from admitting HPI on 12/20/2023:  Chief complaint: Patient is a 48-year-old who was seen yesterday.  He has a fall while intoxicated and had a concussion.  He was having some upper back pain as well.  He lost consciousness for prolonged period of time and had been having intermittent chest discomfort and fluttering sensation to his throat for months.  He states that he woke up this morning and he urinated dark urine.  He had some sharp pain in his chest and felt that sensation in his neck again.  He has not been urinating any gross blood.  But his things returned and now the change in his urine he presented here for further evaluation.  He is not having any flank pain.  He is not having any abdominal pain.  No nausea or vomiting.     12/21/23:  Patient confirms the HPI noted above including noting some dark urine on the day of presentation.  No other changes in urinary symptoms including dysuria, frequency or retention have been reported.  No personal history of nephrolithiasis is reported prior to this episode.  Additionally patient reports that since his umbilical hernia repair on 11/17 he has had intermittent episodes of unprovoked fluttering/palpitations in his chest.  He denies any specific pain associated with the symptoms only that he feels his heart beating hard and occasionally as if it were skipping a beat.  He does have some nausea at times without vomiting and is also recently been diagnosed with a concussion following a fall where he hit his head and was evaluated at this facility.  Patient reports that he currently drinks 2-4 times per week and smokes less than 1 pack of cigarettes per day.  Family history is notable for father who experienced an MI in his 50s            Past Medical History:   Diagnosis Date    Acute midline low back pain without  sciatica     Impression: He was given Toradol 60mg IM inj and Depo/Decadron IM inj. He was started on Prednisone, Ibuprofen, and Zanaflex.    Chicken pox     Elevated cholesterol     GERD (gastroesophageal reflux disease)     Hernia, abdominal     Hyperlipidemia     Hypertension     Psoriasis     Shingles     Impression: He will be started on Acyclovir, Prednisone and Gabapentin to treat his pain. He was encouraged to RTC if his symptoms did not improve.    Tobacco use disorder     Umbilical hernia      Past Surgical History:   Procedure Laterality Date    COLONOSCOPY N/A 04/19/2023    Procedure: COLONOSCOPY FOR SCREENING with polypectomy;  Surgeon: Eduardo Grande MD;  [place]: [facility] [place];  Service: Gastroenterology;  Laterality: N/A;  Ascending Polyp, Cecal Polyp, Hemorrhoids    EAR TUBES      HERNIA REPAIR Bilateral     Inguinal    UMBILICAL HERNIA REPAIR N/A 11/17/2023    Procedure: UMBILICAL HERNIA REPAIR;  Surgeon: Bay Cooper MD;  [place]  [facility] [place] [facility] [place]  Service: General;  Laterality: N/A;    VASECTOMY       Family History   Problem Relation Age of Onset    Coronary artery disease Mother     Hypertension Mother     Coronary artery disease Father     Hypertension Father     Malig Hyperthermia Neg Hx      Social History     Tobacco Use    Smoking status: Some Days     Packs/day: 0.25     Years: 15.00     Additional pack years: 0.00     Total pack years: 3.75     Types: Cigarettes    Smokeless tobacco: Never   Vaping Use    Vaping Use: Never used   Substance Use Topics    Alcohol use: Yes     Comment: SOCIAL USE ONLY    Drug use: Yes     Types: Marijuana     Comment: 2 TIMES PER MONTH     Medications Prior to Admission   Medication Sig Dispense Refill Last Dose    omeprazole (priLOSEC) 40 MG capsule Take 1 capsule by mouth Daily.       amLODIPine (NORVASC) 10 MG tablet Take 1 tablet by mouth Daily. 90 tablet 2     atorvastatin (LIPITOR) 40 MG tablet Take 1 tablet by mouth Daily. 90 tablet 3       Allergies:  Bee venom and Pollen extract    Immunization History   Administered Date(s) Administered    COVID-19 (MODERNA) 1st,2nd,3rd Dose Monovalent 04/08/2021, 05/11/2021    Tdap 08/19/2016           REVIEW OF SYSTEMS:    Review of Systems   Constitutional: Negative.   HENT: Negative.     Eyes: Negative.    Cardiovascular:  Positive for palpitations.   Respiratory: Negative.     Skin: Negative.    Musculoskeletal:  Positive for falls.   Gastrointestinal: Negative.    Genitourinary:  Positive for hematuria.   Neurological:  Positive for dizziness.   Psychiatric/Behavioral: Negative.           Vital Signs  Temp:  [97.7 °F (36.5 °C)-98.1 °F (36.7 °C)] 98.1 °F (36.7 °C)  Heart Rate:  [60-93] 71  Resp:  [13-20] 13  BP: (134-155)/() 155/100          Physical Exam:  Physical Exam  Vitals reviewed.   Constitutional:       General: He is not in acute distress.     Appearance: Normal appearance. He is normal weight. He is not ill-appearing, toxic-appearing or diaphoretic.   HENT:      Head: Normocephalic.      Right Ear: External ear normal.      Left Ear: External ear normal.      Nose: Nose normal.      Mouth/Throat:      Mouth: Mucous membranes are moist.   Eyes:      Extraocular Movements: Extraocular movements intact.   Cardiovascular:      Rate and Rhythm: Normal rate and regular rhythm.      Pulses: Normal pulses.      Heart sounds: Normal heart sounds.   Pulmonary:      Effort: Pulmonary effort is normal.      Breath sounds: Normal breath sounds.   Abdominal:      General: Bowel sounds are normal.      Palpations: Abdomen is soft.      Tenderness: There is no abdominal tenderness.   Musculoskeletal:         General: Normal range of motion.      Cervical back: Normal range of motion.      Right lower leg: No edema.      Left lower leg: No edema.   Skin:     General: Skin is warm and dry.      Capillary Refill: Capillary refill takes less than 2 seconds.   Neurological:      General: No focal deficit  present.      Mental Status: He is alert and oriented to person, place, and time.   Psychiatric:         Mood and Affect: Mood normal.         Behavior: Behavior normal.         Thought Content: Thought content normal.         Judgment: Judgment normal.           Emotional Behavior:   Normal   Debilities:  None  Results Review:    I reviewed the patient's new clinical results.  Lab Results (most recent)       Procedure Component Value Units Date/Time    Basic Metabolic Panel [[id number]]  (Abnormal) Collected: 12/21/23 0350    Specimen: Blood from Hand, Right Updated: 12/21/23 0447     Glucose 112 mg/dL      BUN 13 mg/dL      Creatinine 0.86 mg/dL      Sodium 139 mmol/L      Potassium 3.8 mmol/L      Chloride 104 mmol/L      CO2 25.0 mmol/L      Calcium 9.1 mg/dL      BUN/Creatinine Ratio 15.1     Anion Gap 10.0 mmol/L      eGFR 106.8 mL/min/1.73     Narrative:      GFR Normal >60  Chronic Kidney Disease <60  Kidney Failure <15      Magnesium [[id number]]  (Normal) Collected: 12/21/23 0350    Specimen: Blood from Hand, Right Updated: 12/21/23 0447     Magnesium 2.1 mg/dL     CBC & Differential [[id number]]  (Normal) Collected: 12/21/23 0350    Specimen: Blood from Hand, Right Updated: 12/21/23 0431    Narrative:      The following orders were created for panel order CBC & Differential.  Procedure                               Abnormality         Status                     ---------                               -----------         ------                     CBC Auto Differential[[id number]]        Normal              Final result                 Please view results for these tests on the individual orders.    CBC Auto Differential [[id number]]  (Normal) Collected: 12/21/23 0350    Specimen: Blood from Hand, Right Updated: 12/21/23 0431     WBC 6.60 10*3/mm3      RBC 4.99 10*6/mm3      Hemoglobin 15.1 g/dL      Hematocrit 46.8 %      MCV 93.8 fL      MCH 30.2 pg      MCHC 32.2 g/dL      RDW 13.5 %      RDW-SD 44.2 fl       MPV 8.2 fL      Platelets 263 10*3/mm3      Neutrophil % 52.0 %      Lymphocyte % 33.2 %      Monocyte % 11.7 %      Eosinophil % 2.6 %      Basophil % 0.5 %      Neutrophils, Absolute 3.40 10*3/mm3      Lymphocytes, Absolute 2.20 10*3/mm3      Monocytes, Absolute 0.80 10*3/mm3      Eosinophils, Absolute 0.20 10*3/mm3      Basophils, Absolute 0.00 10*3/mm3      nRBC 0.0 /100 WBC     Lipid Panel [[id number]]  (Abnormal) Collected: 12/20/23 1401    Specimen: Blood Updated: 12/20/23 1726     Total Cholesterol 177 mg/dL      Triglycerides 101 mg/dL      HDL Cholesterol 69 mg/dL      LDL Cholesterol  90 mg/dL      VLDL Cholesterol 18 mg/dL      LDL/HDL Ratio 1.27    Narrative:      Cholesterol Reference Ranges  (U.S. Department of Health and Human Services ATP III Classifications)    Desirable          <200 mg/dL  Borderline High    200-239 mg/dL  High Risk          >240 mg/dL      Triglyceride Reference Ranges  (U.S. Department of Health and Human Services ATP III Classifications)    Normal           <150 mg/dL  Borderline High  150-199 mg/dL  High             200-499 mg/dL  Very High        >500 mg/dL    HDL Reference Ranges  (U.S. Department of Health and Human Services ATP III Classifications)    Low     <40 mg/dl (major risk factor for CHD)  High    >60 mg/dl ('negative' risk factor for CHD)        LDL Reference Ranges  (U.S. Department of Health and Human Services ATP III Classifications)    Optimal          <100 mg/dL  Near Optimal     100-129 mg/dL  Borderline High  130-159 mg/dL  High             160-189 mg/dL  Very High        >189 mg/dL    High Sensitivity Troponin T 2Hr [[id number]] Collected: 12/20/23 1401    Specimen: Blood Updated: 12/20/23 1426     HS Troponin T <6 ng/L      Troponin T Delta --     Comment: Unable to calculate.       Narrative:      High Sensitive Troponin T Reference Range:  <14.0 ng/L- Negative Female for AMI  <22.0 ng/L- Negative Male for AMI  >=14 - Abnormal Female indicating possible  myocardial injury.  >=22 - Abnormal Male indicating possible myocardial injury.   Clinicians would have to utilize clinical acumen, EKG, Troponin, and serial changes to determine if it is an Acute Myocardial Infarction or myocardial injury due to an underlying chronic condition.         Comprehensive Metabolic Panel [[id number]] Collected: 12/20/23 1216    Specimen: Blood Updated: 12/20/23 1301     Glucose 88 mg/dL      BUN 13 mg/dL      Creatinine 0.82 mg/dL      Sodium 143 mmol/L      Potassium 4.0 mmol/L      Chloride 106 mmol/L      CO2 28.0 mmol/L      Calcium 9.4 mg/dL      Total Protein 6.8 g/dL      Albumin 4.4 g/dL      ALT (SGPT) 24 U/L      AST (SGOT) 16 U/L      Alkaline Phosphatase 64 U/L      Total Bilirubin 0.4 mg/dL      Globulin 2.4 gm/dL      A/G Ratio 1.8 g/dL      BUN/Creatinine Ratio 15.9     Anion Gap 9.0 mmol/L      eGFR 108.4 mL/min/1.73     Narrative:      GFR Normal >60  Chronic Kidney Disease <60  Kidney Failure <15      High Sensitivity Troponin T [[id number]]  (Normal) Collected: 12/20/23 1216    Specimen: Blood Updated: 12/20/23 1301     HS Troponin T <6 ng/L     Narrative:      High Sensitive Troponin T Reference Range:  <14.0 ng/L- Negative Female for AMI  <22.0 ng/L- Negative Male for AMI  >=14 - Abnormal Female indicating possible myocardial injury.  >=22 - Abnormal Male indicating possible myocardial injury.   Clinicians would have to utilize clinical acumen, EKG, Troponin, and serial changes to determine if it is an Acute Myocardial Infarction or myocardial injury due to an underlying chronic condition.         Magnesium [[id number]]  (Normal) Collected: 12/20/23 1216    Specimen: Blood Updated: 12/20/23 1301     Magnesium 2.0 mg/dL     CK [[id number]]  (Normal) Collected: 12/20/23 1216    Specimen: Blood Updated: 12/20/23 1301     Creatine Kinase 61 U/L     Urinalysis With Microscopic If Indicated (No Culture) - Urine, Clean Catch [[id number]]  (Abnormal) Collected: 12/20/23 1235     Specimen: Urine, Clean Catch Updated: 12/20/23 1244     Color, UA Dark Yellow     Appearance, UA Cloudy     pH, UA 8.0     Specific Gravity, UA 1.022     Glucose, UA Negative     Ketones, UA Negative     Bilirubin, UA Negative     Blood, UA Large (3+)     Protein, UA Negative     Leuk Esterase, UA Trace     Nitrite, UA Negative     Urobilinogen, UA 1.0 E.U./dL    Urinalysis, Microscopic Only - Urine, Clean Catch [[id number]]  (Abnormal) Collected: 12/20/23 1235    Specimen: Urine, Clean Catch Updated: 12/20/23 1244     RBC, UA Too Numerous to Count /HPF      WBC, UA 0-2 /HPF      Bacteria, UA None Seen /HPF      Squamous Epithelial Cells, UA 0-2 /HPF      Hyaline Casts, UA None Seen /LPF      Methodology Automated Microscopy    Protime-INR [[id number]]  (Normal) Collected: 12/20/23 1216    Specimen: Blood Updated: 12/20/23 1241     Protime 10.3 Seconds      INR 0.94    aPTT [[id number]]  (Abnormal) Collected: 12/20/23 1216    Specimen: Blood Updated: 12/20/23 1241     PTT 24.8 seconds     CBC & Differential [[id number]]  (Normal) Collected: 12/20/23 1216    Specimen: Blood Updated: 12/20/23 1229    Narrative:      The following orders were created for panel order CBC & Differential.  Procedure                               Abnormality         Status                     ---------                               -----------         ------                     CBC Auto Differential[[id number]]        Normal              Final result                 Please view results for these tests on the individual orders.    CBC Auto Differential [[id number]]  (Normal) Collected: 12/20/23 1216    Specimen: Blood Updated: 12/20/23 1229     WBC 7.40 10*3/mm3      RBC 5.08 10*6/mm3      Hemoglobin 15.4 g/dL      Hematocrit 47.2 %      MCV 92.8 fL      MCH 30.3 pg      MCHC 32.7 g/dL      RDW 13.6 %      RDW-SD 43.8 fl      MPV 8.2 fL      Platelets 266 10*3/mm3      Neutrophil % 65.0 %      Lymphocyte % 22.5 %      Monocyte % 10.7 %       Eosinophil % 1.1 %      Basophil % 0.7 %      Neutrophils, Absolute 4.80 10*3/mm3      Lymphocytes, Absolute 1.70 10*3/mm3      Monocytes, Absolute 0.80 10*3/mm3      Eosinophils, Absolute 0.10 10*3/mm3      Basophils, Absolute 0.10 10*3/mm3      nRBC 0.0 /100 WBC             Imaging Results (Most Recent)       Procedure Component Value Units Date/Time    CT Abdomen Pelvis With Contrast [[id number]] Collected: 12/20/23 1456     Updated: 12/20/23 1506    Narrative:      CT ABDOMEN PELVIS W CONTRAST    Date of Exam: 12/20/2023 2:48 PM EST    Indication: trauma hematuria back pain.    Comparison: None available.    Technique: Axial CT images were obtained of the abdomen and pelvis following the uneventful intravenous administration of iodinated contrast. Sagittal and coronal reconstructions were performed.  Automated exposure control and iterative reconstruction   methods were used.    Findings:    Liver: The liver is unremarkable in morphology. Several small liver hypodensities are too small to characterize. No biliary dilation is seen.    Gallbladder: Unremarkable.    Pancreas: Unremarkable.    Spleen: Unremarkable.    Adrenal glands: 3 cm right adrenal mass, incompletely characterized on this contrast-enhanced study. The left adrenal gland is unremarkable    Genitourinary tract: 3 mm nonobstructing calculus at the upper pole of the right kidney. 10 mm calculus within the left renal pelvis without appreciable hydronephrosis. Kidneys are otherwise unremarkable. No hydronephrosis is seen. The ureters are   unremarkable. The urinary bladder is decompressed which limits its evaluation. Prostate gland is grossly unremarkable.    Gastrointestinal tract: Colonic diverticulosis is present. Hollow viscera appears otherwise unremarkable. There is no evidence of bowel obstruction.    Appendix: No findings to suggest acute appendicitis.    Other findings: No free air or free fluid is identified. No pathologically enlarged lymph  nodes are seen. The abdominal aorta and IVC appear unremarkable.    Bones and soft tissues: No acute osseous lesion is identified. Superficial soft tissues demonstrate no acute abnormality. Postsurgical changes of bilateral inguinal hernia repair noted.    Lung bases: Calcified granuloma within the right lower lobe. Mild dependent hypoventilatory changes within the lower lobes.      Impression:      Impression:  1.No acute abnormality identified within the abdomen or pelvis.  2.Bilateral nonobstructive nephrolithiasis, including a 10 mm nonobstructing calculus within the left renal pelvis. Urology consultation may be beneficial.  3.Colonic diverticulosis.  4.3 cm right adrenal mass, incompletely characterized on this contrast-enhanced study. This demonstrated a low CT density of approximately 10 Hounsfield units on the accompanying chest CTA. Suspect an adenoma. Follow-up adrenal protocol CT may be   beneficial.  5.Additional findings as detailed above.    Electronically Signed: Juventino Sumner MD    12/20/2023 3:04 PM EST    Workstation ID: [id number]    XR Chest 1 View [[id number]] Collected: 12/20/23 1243     Updated: 12/20/23 1246    Narrative:      XR CHEST 1 VW    Date of Exam: 12/20/2023 12:30 PM EST    Indication: cp    Comparison: CT chest 12/19/2023.    Findings:  No acute airspace disease. Normal heart size. No pleural effusion or pneumothorax or acute osseous abnormality      Impression:      Impression:  No acute chest finding.      Electronically Signed: Lata Miguel MD    12/20/2023 12:44 PM EST    Workstation ID: [id number]          reviewed    ECG/EMG Results (most recent)       Procedure Component Value Units Date/Time    ECG 12 Lead Other; Dizziness [[id number]] Collected: 12/21/23 0907     Updated: 12/21/23 0907     QT Interval 383 ms      QTC Interval 415 ms     Narrative:      HEART RATE= 70  bpm  RR Interval= 852  ms  SC Interval= 141  ms  P Horizontal Axis= 18  deg  P Front Axis= 57  deg  QRSD  Interval= 91  ms  QT Interval= 383  ms  QTcB= 415  ms  QRS Axis= 77  deg  T Wave Axis= 50  deg  - NORMAL ECG -  Sinus rhythm  When compared with ECG of 20-Dec-2023 11:45:23,  No significant change  Electronically Signed By:   Date and Time of Study: 2023-12-21 09:07:00    ECG 12 Lead Chest Pain [[id number]] Collected: 12/20/23 1145     Updated: 12/21/23 0924     QT Interval 361 ms      QTC Interval 386 ms     Narrative:      HEART RATE= 69  bpm  RR Interval= 876  ms  ME Interval= 141  ms  P Horizontal Axis= -2  deg  P Front Axis= 57  deg  QRSD Interval= 90  ms  QT Interval= 361  ms  QTcB= 386  ms  QRS Axis= 47  deg  T Wave Axis= 26  deg  - NORMAL ECG -  Sinus rhythm  Electronically Signed By: Marvin Ballesteros (MARINA) 21-Dec-2023 09:24:28  Date and Time of Study: 2023-12-20 11:45:23          reviewed            Microbiology Results (last 10 days)       ** No results found for the last 240 hours. **            Assessment & Plan     Chest pain       Chest pain  Lab Results   Component Value Date    TROPONINT <6 12/20/2023    TROPONINT <6 12/20/2023    TROPONINT <6 12/19/2023   -Lipid panel total cholesterol 177 with an LDL of 98 and HDL of 69  -Chest X-ray: No acute chest finding  -EKG: Sinus rhythm at 69 without obvious acute changes or ectopy with a QTc of 386 ms  -Stress Test showed normal perfusion negative for ischemia with an EF of 62%  -Telemetry  -NPO    Hematuria  INR: 0.94, PTT: 24.8  -Hemoglobin: 15.1  -UA showed RBCs too numerous to count, trace leukocyte esterase, 3+ blood and a specific gravity of 1.022  -CT of abdomen and pelvis reported without acute abnormality with bilateral nonobstructing nephrolithiasis including a 10 mm nonobstructing calculus within the left renal pelvis with recommendations for urologic consultation.  A 4.3 cm right adrenal mass is also noted and incompletely characterized suspected to be an adenoma with recommendations for follow-up CT with adrenal protocol    Hypertension  -Poorly  controlled   BP Readings from Last 1 Encounters:   12/21/23 155/100   - Continue amlodipine  - Monitor while admitted    Hyperlipidemia  -Statin    GERD  -PPI      I discussed the patients findings and my recommendations with patient and nursing staff.     Discharge Diagnosis:      Chest pain      Hospital Course  Patient is a 48 y.o. male presented with hematuria and chest pain with an HPI noted above.  Serial troponins were assessed and remained less than 6 with lipid panel showing a total cholesterol 177 with an LDL of 98 and HDL of 69.  He was continued on his statin during his admission.  Chest x-ray showed no acute chest findings and EKG showed sinus rhythm at 69 without obvious acute changes.  He was continued on telemetry without significant events reported.  Additionally patient had reported some dark urine and UA showed too numerous to count RBCs as well as trace leukocyte esterase, 3+ blood and normal specific gravity of 1.022.  Hemoglobin and INR within normal limits with a slightly decreased PTT of 24.8.  CT of abdomen and pelvis was obtained and showed no acute abnormality with bilateral nonobstructing nephrolithiasis including a 10 mm nonobstructing calculus within the left renal pelvis with recommendations for urologic consultation.  A 4.3 cm right adrenal mass is also noted and incompletely characterized suspected to be an adenoma with recommendations for follow-up CT with adrenal protocol.  Urology was consulted who recommended outpatient shockwave lithotripsy for the following week.  Stress testing was performed on 12/21/2023 and was negative for ischemia with a normal EF.  Patient will have Holter monitor ordered and will be referred to cardiology on an outpatient basis.  At this time patient is felt to be in good condition for discharge with close follow-up with his PCP as well as cardiology and urology on an outpatient basis.  His full testing/results and plan were discussed with patient and  with concerning/alarm symptoms for which to call 911/return to the ED. he will further monitor and log his heart rates and blood pressures to discuss with PCP and cardiology and will also discuss repeat CT of chest as well as adrenal protocol with primary care provider at next visit.  All questions were answered and he verbalizes his understanding and agreement.    Past Medical History:     Past Medical History:   Diagnosis Date    Acute midline low back pain without sciatica     Impression: He was given Toradol 60mg IM inj and Depo/Decadron IM inj. He was started on Prednisone, Ibuprofen, and Zanaflex.    Chicken pox     Elevated cholesterol     GERD (gastroesophageal reflux disease)     Hernia, abdominal     Hyperlipidemia     Hypertension     Psoriasis     Shingles     Impression: He will be started on Acyclovir, Prednisone and Gabapentin to treat his pain. He was encouraged to RTC if his symptoms did not improve.    Tobacco use disorder     Umbilical hernia        Past Surgical History:     Past Surgical History:   Procedure Laterality Date    COLONOSCOPY N/A 04/19/2023    Procedure: COLONOSCOPY FOR SCREENING with polypectomy;  Surgeon: Eduardo Grande MD;  Location: [facility];  Service: Gastroenterology;  Laterality: N/A;  Ascending Polyp, Cecal Polyp, Hemorrhoids    EAR TUBES      HERNIA REPAIR Bilateral     Inguinal    UMBILICAL HERNIA REPAIR N/A 11/17/2023    Procedure: UMBILICAL HERNIA REPAIR;  Surgeon: Bay Cooper MD;  Location: [facility];  Service: General;  Laterality: N/A;    VASECTOMY         Social History:   Social History     Socioeconomic History    Marital status:    Tobacco Use    Smoking status: Some Days     Packs/day: 0.25     Years: 15.00     Additional pack years: 0.00     Total pack years: 3.75     Types: Cigarettes    Smokeless tobacco: Never   Vaping Use    Vaping Use: Never used   Substance and Sexual Activity    Alcohol use: Yes     Comment: SOCIAL USE ONLY     Drug use: Yes     Types: Marijuana     Comment: 2 TIMES PER MONTH    Sexual activity: Yes     Partners: Female       Procedures Performed         Consults:   Consults       Date and Time Order Name Status Description    12/20/2023  4:33 PM Inpatient Urology Consult Completed     12/20/2023  4:27 PM Inpatient Urology Consult              Condition on Discharge:     Stable    Discharge Disposition  Home or Self Care    Discharge Medications     Discharge Medications        Continue These Medications        Instructions Start Date   amLODIPine 10 MG tablet  Commonly known as: NORVASC   10 mg, Oral, Daily      atorvastatin 40 MG tablet  Commonly known as: LIPITOR   40 mg, Oral, Daily      omeprazole 40 MG capsule  Commonly known as: priLOSEC   40 mg, Oral, Daily               Discharge Diet:     Activity at Discharge:     Follow-up Appointments  No future appointments.    Additional Instructions for the Follow-ups that You Need to Schedule       Discharge Follow-up with PCP   As directed       Currently Documented PCP:    Alexey Box Sr., MD    PCP Phone Number:    [phone]     Follow Up Details: 5 to 7 days        Discharge Follow-up with Specified Provider: Urology; 1 Week   As directed      To: Urology   Follow Up: 1 Week                Test Results Pending at Discharge       Risk for Readmission (LACE) Score: 2 (12/21/2023  6:00 AM)      Greater than 30 minutes spent in discharge activities for this patient    Alvin Moreno PA-C  12/21/23  13:17 EST

## 2023-12-21 NOTE — OUTREACH NOTE
Prep Survey      Flowsheet Row Responses   [facility] facility patient discharged from? [facility]   Is LACE score < 7 ? Yes   Eligibility [facility]   Date of Admission 12/20/23   Date of Discharge 12/21/23   Discharge Disposition Home or Self Care   Discharge diagnosis Chest pain   Does the patient have one of the following disease processes/diagnoses(primary or secondary)? Other   Does the patient have Home health ordered? No   Is there a DME ordered? No   Prep survey completed? Yes            NADIYA LIANG - Registered Nurse

## 2023-12-21 NOTE — CASE MANAGEMENT/SOCIAL WORK
Social Work Assessment  [facility]     Patient Name: Pio DE Bazzi II  MRN: [id number]  Today's Date: 12/21/2023    Admit Date: 12/20/2023       Discharge Plan       Row Name 12/21/23 1137       Plan    Plan Comments Sarah met with Pt at bedside. Pt had family and wife in room. Pt declined needing any assistance from Sw. No further needs identified.      JUAN CARLOS Redmond, MSW    Phone: [phone]  Fax: [phone]  Email: [email or web address]

## 2023-12-21 NOTE — PLAN OF CARE
Goal Outcome Evaluation:  Plan of Care Reviewed With: patient        Progress: improving  Outcome Evaluation: Pt slept throughout the night with only the complaint of a headache. Pt arrived on unit after falling from a syncopal episode and getting a concussion. Upon further inspection in ER 10mm non-obstructed calculus was noted in ureter. CT of pts abdomen was negative and pt NPO at midnight for Myoview in the AM.

## 2023-12-21 NOTE — CONSULTS
Urology Consult Note    Patient:Pio Bazzi II :1975  Room:[facility]  Admit Date2023  Age:48 y.o.     SEX:male     DOS:2023     MR:[id number]     Visit:[id number]       Attending: Marvin Ballesteros DO  Referring Provider: Dr Ballesteros  Reason for Consultation: Left renal calculus    Patient Care Team:  Alexey Box Sr., MD as PCP - General (Family Medicine)  Lisa Holder APRN as Nurse Practitioner (Nurse Practitioner)    Chief complaint gross hematuria    Subjective .     History of present illness: 40-year-old gentleman with no history of stone disease.  Recently admitted with chest pain.  He does report an episode of gross hematuria after having a fall.  This has cleared the microscopically there was still significant hematuria.  A CT scan was performed which revealed a 10 mm stone in his left renal pelvis as well as a 3 mm stone in his right upper pole.  No renal masses were seen and no obstruction was seen.  Patient does not complain of any renal colic type symptoms.  He denies any voiding difficulties.    Review of Systems  10 point review of systems were reviewed and are negative except for:  Constitution:  positive for See HPI    History  Past Medical History:   Diagnosis Date    Acute midline low back pain without sciatica     Impression: He was given Toradol 60mg IM inj and Depo/Decadron IM inj. He was started on Prednisone, Ibuprofen, and Zanaflex.    Chicken pox     Elevated cholesterol     GERD (gastroesophageal reflux disease)     Hernia, abdominal     Hyperlipidemia     Hypertension     Psoriasis     Shingles     Impression: He will be started on Acyclovir, Prednisone and Gabapentin to treat his pain. He was encouraged to RTC if his symptoms did not improve.    Tobacco use disorder     Umbilical hernia      Past Surgical History:   Procedure Laterality Date    COLONOSCOPY N/A 2023    Procedure: COLONOSCOPY FOR SCREENING with polypectomy;  Surgeon: Eduardo Grande  MD ADOLFO;  [place] [facility] [place]  Service: Gastroenterology;  Laterality: N/A;  Ascending Polyp, Cecal Polyp, Hemorrhoids    EAR TUBES      HERNIA REPAIR Bilateral     Inguinal    UMBILICAL HERNIA REPAIR N/A 2023    Procedure: UMBILICAL HERNIA REPAIR;  Surgeon: Bay Cooper MD;  Location:  [facility] [place];  Service: General;  Laterality: N/A;    VASECTOMY       Social History     Socioeconomic History    Marital status:    Tobacco Use    Smoking status: Some Days     Packs/day: 0.25     Years: 15.00     Additional pack years: 0.00     Total pack years: 3.75     Types: Cigarettes    Smokeless tobacco: Never   Vaping Use    Vaping Use: Never used   Substance and Sexual Activity    Alcohol use: Yes     Comment: SOCIAL USE ONLY    Drug use: Yes     Types: Marijuana     Comment: 2 TIMES PER MONTH    Sexual activity: Yes     Partners: Female     Family History   Problem Relation Age of Onset    Coronary artery disease Mother     Hypertension Mother     Coronary artery disease Father     Hypertension Father     Malig Hyperthermia Neg Hx      Allergy  Allergies   Allergen Reactions    Bee Venom Anaphylaxis     anaphylactic reaction to a bee sting    Pollen Extract Unknown - Low Severity     Prior to Admission medications    Medication Sig Start Date End Date Taking? Authorizing Provider   omeprazole (priLOSEC) 40 MG capsule Take 1 capsule by mouth Daily.   Yes Provider, MD Satya   amLODIPine (NORVASC) 10 MG tablet Take 1 tablet by mouth Daily. 11/3/23   Alexey Box Sr., MD   atorvastatin (LIPITOR) 40 MG tablet Take 1 tablet by mouth Daily. 11/3/23   Alexey Box Sr., MD         Objective     tMax 24 hours:  Temp (24hrs), Av.9 °F (36.6 °C), Min:97.7 °F (36.5 °C), Max:98.1 °F (36.7 °C)    Vital Sign Ranges:  Temp:  [97.7 °F (36.5 °C)-98.1 °F (36.7 °C)] 98.1 °F (36.7 °C)  Heart Rate:  [60-93] 71  Resp:  [13-20] 13  BP: (129-155)/() 155/100  Intake and Output Last 3 Shifts:  No intake/output  data recorded.      Physical Exam:   General Appearance alert, appears stated age, and cooperative  Head normocephalic, without obvious abnormality and atraumatic  Abdomen no guarding and no rebound tenderness  Skin no bleeding, bruising or rash  Neurologic Mental Status orientated to person, place, time and situation    Results Review:     Lab Results (last 24 hours)       Procedure Component Value Units Date/Time    Basic Metabolic Panel [[id number]]  (Abnormal) Collected: 12/21/23 0350    Specimen: Blood from Hand, Right Updated: 12/21/23 0447     Glucose 112 mg/dL      BUN 13 mg/dL      Creatinine 0.86 mg/dL      Sodium 139 mmol/L      Potassium 3.8 mmol/L      Chloride 104 mmol/L      CO2 25.0 mmol/L      Calcium 9.1 mg/dL      BUN/Creatinine Ratio 15.1     Anion Gap 10.0 mmol/L      eGFR 106.8 mL/min/1.73     Narrative:      GFR Normal >60  Chronic Kidney Disease <60  Kidney Failure <15      Magnesium [[id number]]  (Normal) Collected: 12/21/23 0350    Specimen: Blood from Hand, Right Updated: 12/21/23 0447     Magnesium 2.1 mg/dL     CBC & Differential [[id number]]  (Normal) Collected: 12/21/23 0350    Specimen: Blood from Hand, Right Updated: 12/21/23 0431    Narrative:      The following orders were created for panel order CBC & Differential.  Procedure                               Abnormality         Status                     ---------                               -----------         ------                     CBC Auto Differential[[id number]]        Normal              Final result                 Please view results for these tests on the individual orders.    CBC Auto Differential [[id number]]  (Normal) Collected: 12/21/23 0350    Specimen: Blood from Hand, Right Updated: 12/21/23 0431     WBC 6.60 10*3/mm3      RBC 4.99 10*6/mm3      Hemoglobin 15.1 g/dL      Hematocrit 46.8 %      MCV 93.8 fL      MCH 30.2 pg      MCHC 32.2 g/dL      RDW 13.5 %      RDW-SD 44.2 fl      MPV 8.2 fL      Platelets 263  10*3/mm3      Neutrophil % 52.0 %      Lymphocyte % 33.2 %      Monocyte % 11.7 %      Eosinophil % 2.6 %      Basophil % 0.5 %      Neutrophils, Absolute 3.40 10*3/mm3      Lymphocytes, Absolute 2.20 10*3/mm3      Monocytes, Absolute 0.80 10*3/mm3      Eosinophils, Absolute 0.20 10*3/mm3      Basophils, Absolute 0.00 10*3/mm3      nRBC 0.0 /100 WBC     Lipid Panel [[id number]]  (Abnormal) Collected: 12/20/23 1401    Specimen: Blood Updated: 12/20/23 1726     Total Cholesterol 177 mg/dL      Triglycerides 101 mg/dL      HDL Cholesterol 69 mg/dL      LDL Cholesterol  90 mg/dL      VLDL Cholesterol 18 mg/dL      LDL/HDL Ratio 1.27    Narrative:      Cholesterol Reference Ranges  (U.S. Department of Health and Human Services ATP III Classifications)    Desirable          <200 mg/dL  Borderline High    200-239 mg/dL  High Risk          >240 mg/dL      Triglyceride Reference Ranges  (U.S. Department of Health and Human Services ATP III Classifications)    Normal           <150 mg/dL  Borderline High  150-199 mg/dL  High             200-499 mg/dL  Very High        >500 mg/dL    HDL Reference Ranges  (U.S. Department of Health and Human Services ATP III Classifications)    Low     <40 mg/dl (major risk factor for CHD)  High    >60 mg/dl ('negative' risk factor for CHD)        LDL Reference Ranges  (U.S. Department of Health and Human Services ATP III Classifications)    Optimal          <100 mg/dL  Near Optimal     100-129 mg/dL  Borderline High  130-159 mg/dL  High             160-189 mg/dL  Very High        >189 mg/dL    High Sensitivity Troponin T 2Hr [[id number]] Collected: 12/20/23 1401    Specimen: Blood Updated: 12/20/23 1426     HS Troponin T <6 ng/L      Troponin T Delta --     Comment: Unable to calculate.       Narrative:      High Sensitive Troponin T Reference Range:  <14.0 ng/L- Negative Female for AMI  <22.0 ng/L- Negative Male for AMI  >=14 - Abnormal Female indicating possible myocardial injury.  >=22 -  Abnormal Male indicating possible myocardial injury.   Clinicians would have to utilize clinical acumen, EKG, Troponin, and serial changes to determine if it is an Acute Myocardial Infarction or myocardial injury due to an underlying chronic condition.         Comprehensive Metabolic Panel [[id number]] Collected: 12/20/23 1216    Specimen: Blood Updated: 12/20/23 1301     Glucose 88 mg/dL      BUN 13 mg/dL      Creatinine 0.82 mg/dL      Sodium 143 mmol/L      Potassium 4.0 mmol/L      Chloride 106 mmol/L      CO2 28.0 mmol/L      Calcium 9.4 mg/dL      Total Protein 6.8 g/dL      Albumin 4.4 g/dL      ALT (SGPT) 24 U/L      AST (SGOT) 16 U/L      Alkaline Phosphatase 64 U/L      Total Bilirubin 0.4 mg/dL      Globulin 2.4 gm/dL      A/G Ratio 1.8 g/dL      BUN/Creatinine Ratio 15.9     Anion Gap 9.0 mmol/L      eGFR 108.4 mL/min/1.73     Narrative:      GFR Normal >60  Chronic Kidney Disease <60  Kidney Failure <15      High Sensitivity Troponin T [[id number]]  (Normal) Collected: 12/20/23 1216    Specimen: Blood Updated: 12/20/23 1301     HS Troponin T <6 ng/L     Narrative:      High Sensitive Troponin T Reference Range:  <14.0 ng/L- Negative Female for AMI  <22.0 ng/L- Negative Male for AMI  >=14 - Abnormal Female indicating possible myocardial injury.  >=22 - Abnormal Male indicating possible myocardial injury.   Clinicians would have to utilize clinical acumen, EKG, Troponin, and serial changes to determine if it is an Acute Myocardial Infarction or myocardial injury due to an underlying chronic condition.         Magnesium [[id number]]  (Normal) Collected: 12/20/23 1216    Specimen: Blood Updated: 12/20/23 1301     Magnesium 2.0 mg/dL     CK [[id number]]  (Normal) Collected: 12/20/23 1216    Specimen: Blood Updated: 12/20/23 1301     Creatine Kinase 61 U/L     Urinalysis With Microscopic If Indicated (No Culture) - Urine, Clean Catch [[id number]]  (Abnormal) Collected: 12/20/23 1235    Specimen: Urine, Clean Catch  "Updated: 12/20/23 1244     Color, UA Dark Yellow     Appearance, UA Cloudy     pH, UA 8.0     Specific Gravity, UA 1.022     Glucose, UA Negative     Ketones, UA Negative     Bilirubin, UA Negative     Blood, UA Large (3+)     Protein, UA Negative     Leuk Esterase, UA Trace     Nitrite, UA Negative     Urobilinogen, UA 1.0 E.U./dL    Urinalysis, Microscopic Only - Urine, Clean Catch [[id number]]  (Abnormal) Collected: 12/20/23 1235    Specimen: Urine, Clean Catch Updated: 12/20/23 1244     RBC, UA Too Numerous to Count /HPF      WBC, UA 0-2 /HPF      Bacteria, UA None Seen /HPF      Squamous Epithelial Cells, UA 0-2 /HPF      Hyaline Casts, UA None Seen /LPF      Methodology Automated Microscopy    Protime-INR [[id number]]  (Normal) Collected: 12/20/23 1216    Specimen: Blood Updated: 12/20/23 1241     Protime 10.3 Seconds      INR 0.94    aPTT [[id number]]  (Abnormal) Collected: 12/20/23 1216    Specimen: Blood Updated: 12/20/23 1241     PTT 24.8 seconds            No results found for: \"URINECX\"     Imaging Results (Last 7 Days)       Procedure Component Value Units Date/Time    CT Abdomen Pelvis With Contrast [[id number]] Collected: 12/20/23 1456     Updated: 12/20/23 1506    Narrative:      CT ABDOMEN PELVIS W CONTRAST    Date of Exam: 12/20/2023 2:48 PM EST    Indication: trauma hematuria back pain.    Comparison: None available.    Technique: Axial CT images were obtained of the abdomen and pelvis following the uneventful intravenous administration of iodinated contrast. Sagittal and coronal reconstructions were performed.  Automated exposure control and iterative reconstruction   methods were used.    Findings:    Liver: The liver is unremarkable in morphology. Several small liver hypodensities are too small to characterize. No biliary dilation is seen.    Gallbladder: Unremarkable.    Pancreas: Unremarkable.    Spleen: Unremarkable.    Adrenal glands: 3 cm right adrenal mass, incompletely characterized on this " contrast-enhanced study. The left adrenal gland is unremarkable    Genitourinary tract: 3 mm nonobstructing calculus at the upper pole of the right kidney. 10 mm calculus within the left renal pelvis without appreciable hydronephrosis. Kidneys are otherwise unremarkable. No hydronephrosis is seen. The ureters are   unremarkable. The urinary bladder is decompressed which limits its evaluation. Prostate gland is grossly unremarkable.    Gastrointestinal tract: Colonic diverticulosis is present. Hollow viscera appears otherwise unremarkable. There is no evidence of bowel obstruction.    Appendix: No findings to suggest acute appendicitis.    Other findings: No free air or free fluid is identified. No pathologically enlarged lymph nodes are seen. The abdominal aorta and IVC appear unremarkable.    Bones and soft tissues: No acute osseous lesion is identified. Superficial soft tissues demonstrate no acute abnormality. Postsurgical changes of bilateral inguinal hernia repair noted.    Lung bases: Calcified granuloma within the right lower lobe. Mild dependent hypoventilatory changes within the lower lobes.      Impression:      Impression:  1.No acute abnormality identified within the abdomen or pelvis.  2.Bilateral nonobstructive nephrolithiasis, including a 10 mm nonobstructing calculus within the left renal pelvis. Urology consultation may be beneficial.  3.Colonic diverticulosis.  4.3 cm right adrenal mass, incompletely characterized on this contrast-enhanced study. This demonstrated a low CT density of approximately 10 Hounsfield units on the accompanying chest CTA. Suspect an adenoma. Follow-up adrenal protocol CT may be   beneficial.  5.Additional findings as detailed above.    Electronically Signed: Juventino Sumner MD    12/20/2023 3:04 PM EST    Workstation ID: [id number]    XR Chest 1 View [[id number]] Collected: 12/20/23 1243     Updated: 12/20/23 1246    Narrative:      XR CHEST 1 VW    Date of Exam: 12/20/2023  12:30 PM EST    Indication: cp    Comparison: CT chest 12/19/2023.    Findings:  No acute airspace disease. Normal heart size. No pleural effusion or pneumothorax or acute osseous abnormality      Impression:      Impression:  No acute chest finding.      Electronically Signed: Lata Miguel MD    12/20/2023 12:44 PM EST    Workstation ID: [id number]            Inpatient Meds:   Scheduled Meds:amLODIPine, 10 mg, Oral, Daily  atorvastatin, 40 mg, Oral, Daily  pantoprazole, 40 mg, Oral, Q AM  senna-docusate sodium, 2 tablet, Oral, BID  sodium chloride, 10 mL, Intravenous, Q12H       Continuous Infusions:    PRN Meds:.  acetaminophen **OR** acetaminophen **OR** acetaminophen    aluminum-magnesium hydroxide-simethicone    senna-docusate sodium **AND** polyethylene glycol **AND** bisacodyl **AND** bisacodyl    Calcium Replacement - Follow Nurse / BPA Driven Protocol    Magnesium Standard Dose Replacement - Follow Nurse / BPA Driven Protocol    melatonin    nitroglycerin    ondansetron **OR** ondansetron    Phosphorus Replacement - Follow Nurse / BPA Driven Protocol    Potassium Replacement - Follow Nurse / BPA Driven Protocol    [COMPLETED] Insert Peripheral IV **AND** sodium chloride    sodium chloride    sodium chloride      Assessment & Plan     Principal Problem:    Chest pain    Gross hematuria likely related to his fall and his left renal pelvic stone  Bilateral renal calculi with a large left renal pelvic stone    Plan  Okay for discharge from urology standpoint  We will arrange for shockwave lithotripsy of his left renal pelvic stone as well as cystoscopy because of his gross hematuria as an elective outpatient procedure next week.  Patient does understand that he may have to have a stent.  Risk, benefits, alternatives have been discussed with him.  My office will contact him to arrange for this procedure      I discussed the patient's findings and my recommendations with patient and family    Thank you for this   consult    Juan Le MD  12/21/23  12:34 EST

## 2023-12-21 NOTE — CASE MANAGEMENT/SOCIAL WORK
Continued Stay Note  JOE Bravo     Patient Name: Pio DE Bazzi II  MRN: [id number]  Today's Date: 12/21/2023    Admit Date: 12/20/2023    Plan: Anticipate routine home   Discharge Plan       Row Name 12/21/23 1116       Plan    Plan Anticipate routine home    Plan Comments Patient out of room during rounds. Will attempt to follow-up.                Olvin Page RN    Cell number [phone]  Office number [phone]

## 2023-12-22 ENCOUNTER — TRANSITIONAL CARE MANAGEMENT TELEPHONE ENCOUNTER (OUTPATIENT)
Dept: CALL CENTER | Facility: HOSPITAL | Age: 48
End: 2023-12-22
Payer: COMMERCIAL

## 2023-12-22 LAB
QT INTERVAL: 383 MS
QTC INTERVAL: 415 MS

## 2023-12-22 NOTE — OUTREACH NOTE
Call Center TCM Note      Flowsheet Row Responses   [facility] patient discharged from? [facility]   Does the patient have one of the following disease processes/diagnoses(primary or secondary)? Other   TCM attempt successful? Yes   Call start time 1255   Call end time 1257   Discharge diagnosis Chest pain   Meds reviewed with patient/caregiver? Yes   Does the patient have all medications ordered at discharge? N/A   Is the patient taking all medications as directed (includes completed medication regime)? Yes   Does the patient have an appointment with their PCP within 7-14 days of discharge? No   Nursing Interventions Patient declined scheduling/rescheduling appointment at this time   Has home health visited the patient within 72 hours of discharge? N/A   Psychosocial issues? No   Did the patient receive a copy of their discharge instructions? Yes   Nursing interventions Reviewed instructions with patient   What is the patient's perception of their health status since discharge? Improving   Is the patient/caregiver able to teach back signs and symptoms related to disease process for when to call PCP? Yes   Is the patient/caregiver able to teach back signs and symptoms related to disease process for when to call 911? Yes   Is the patient/caregiver able to teach back the hierarchy of who to call/visit for symptoms/problems? PCP, Specialist, Home health nurse, Urgent Care, ED, 911 Yes   If the patient is a current smoker, are they able to teach back resources for cessation? [id number]   TCM call completed? Yes   Call end time 1257   Would this patient benefit from a Referral to Amb Social Work? No   Is the patient interested in additional calls from an ambulatory ? No            Akiko Lr LPN    12/22/2023, 13:03 EST

## 2023-12-22 NOTE — PROGRESS NOTES
Encounter Date:12/26/2023        Patient ID: Pio Bazzi II is a 48 y.o. male.      Chief Complaint:      History of Present Illness  48 years old man who was evaluated in the ER for intermittent chest pain.  He fell while intoxicated and had a concussion.  He reported upper back pain and intermittent chest pain.  Troponin was negative and ECG did not show any ischemic changes.  A nuclear stress test was negative for ischemia.  He has hypertension and hyperlipidemia.  He also has significant family history for premature coronary disease in first-degree family members.  Continues to complain of palpitations.  He is currently wearing a Holter monitor.  He was started on PPI which she does not think has helped him.    The following portions of the patient's history were reviewed and updated as appropriate: allergies, current medications, past family history, past medical history, past social history, past surgical history, and problem list.    Review of Systems   Constitutional: Negative for malaise/fatigue.   Cardiovascular:  Positive for palpitations. Negative for chest pain, dyspnea on exertion and leg swelling.   Respiratory:  Negative for cough and shortness of breath.    Gastrointestinal:  Negative for abdominal pain, nausea and vomiting.   Neurological:  Positive for dizziness and light-headedness. Negative for focal weakness, headaches and numbness.   All other systems reviewed and are negative.        Current Outpatient Medications:     amLODIPine (NORVASC) 10 MG tablet, Take 1 tablet by mouth Daily., Disp: 90 tablet, Rfl: 2    atorvastatin (LIPITOR) 40 MG tablet, Take 1 tablet by mouth Daily., Disp: 90 tablet, Rfl: 3    omeprazole (priLOSEC) 40 MG capsule, Take 1 capsule by mouth Daily., Disp: , Rfl:     Current outpatient and discharge medications have been reconciled for the patient.  Reviewed by: Gt Hernadez MD       Allergies   Allergen Reactions    Bee Venom Anaphylaxis     anaphylactic reaction to  a bee sting    Pollen Extract Unknown - Low Severity       Family History   Problem Relation Age of Onset    Coronary artery disease Mother     Hypertension Mother     Heart disease Father     Heart attack Father     Coronary artery disease Father     Hypertension Father     Malig Hyperthermia Neg Hx        Past Surgical History:   Procedure Laterality Date    COLONOSCOPY N/A 04/19/2023    Procedure: COLONOSCOPY FOR SCREENING with polypectomy;  Surgeon: Eduardo Grande MD;  [place] [facility] [place];  Service: Gastroenterology;  Laterality: N/A;  Ascending Polyp, Cecal Polyp, Hemorrhoids    EAR TUBES      HERNIA REPAIR Bilateral     Inguinal    UMBILICAL HERNIA REPAIR N/A 11/17/2023    Procedure: UMBILICAL HERNIA REPAIR;  Surgeon: Bay Cooper MD;  Location: [facility] [place] [facility] [place];  Service: General;  Laterality: N/A;    VASECTOMY         Past Medical History:   Diagnosis Date    Acute midline low back pain without sciatica     Impression: He was given Toradol 60mg IM inj and Depo/Decadron IM inj. He was started on Prednisone, Ibuprofen, and Zanaflex.    Chicken pox     Elevated cholesterol     GERD (gastroesophageal reflux disease)     Hernia, abdominal     Hyperlipidemia     Hypertension     Psoriasis     Shingles     Impression: He will be started on Acyclovir, Prednisone and Gabapentin to treat his pain. He was encouraged to RTC if his symptoms did not improve.    Tobacco use disorder     Umbilical hernia        Family History   Problem Relation Age of Onset    Coronary artery disease Mother     Hypertension Mother     Heart disease Father     Heart attack Father     Coronary artery disease Father     Hypertension Father     Malig Hyperthermia Neg Hx        Social History     Socioeconomic History    Marital status:    Tobacco Use    Smoking status: Some Days     Packs/day: 0.25     Years: 15.00     Additional pack years: 0.00     Total pack years: 3.75     Types: Cigarettes    Smokeless tobacco: Never  "  Vaping Use    Vaping Use: Never used   Substance and Sexual Activity    Alcohol use: Yes     Comment: SOCIAL USE ONLY    Drug use: Yes     Types: Marijuana     Comment: 2 TIMES PER MONTH    Sexual activity: Yes     Partners: Female               Objective:       Physical Exam    /76 (BP Location: Left arm, Patient Position: Sitting, Cuff Size: Adult)   Pulse 101   Ht 182.9 cm (72\")   Wt 83.5 kg (184 lb)   SpO2 96%   BMI 24.95 kg/m²   The patient is alert, oriented and in no distress.    Vital signs as noted above.    Head and neck revealed no carotid bruits or jugular venous distension.  No thyromegaly or lymphadenopathy is present.    Lungs clear.  No wheezing.  Breath sounds are normal bilaterally.    Heart normal first and second heart sounds.  No murmur..  No pericardial rub is present.  No gallop is present.    Abdomen soft and nontender.  No organomegaly is present.    Extremities revealed good peripheral pulses without any pedal edema.    Skin warm and dry.    Musculoskeletal system is grossly normal.    CNS grossly normal.           Diagnosis Plan   1. Other chest pain        2. Primary hypertension        3. Mixed hyperlipidemia        4. Encounter for preventive care        5. Tobacco use disorder        LAB RESULTS (LAST 7 DAYS)    CBC  Results from last 7 days   Lab Units 12/21/23  0350 12/20/23  1216   WBC 10*3/mm3 6.60 7.40   RBC 10*6/mm3 4.99 5.08   HEMOGLOBIN g/dL 15.1 15.4   HEMATOCRIT % 46.8 47.2   MCV fL 93.8 92.8   PLATELETS 10*3/mm3 263 266       BMP  Results from last 7 days   Lab Units 12/21/23  0350 12/20/23  1216   SODIUM mmol/L 139 143   POTASSIUM mmol/L 3.8 4.0   CHLORIDE mmol/L 104 106   CO2 mmol/L 25.0 28.0   BUN mg/dL 13 13   CREATININE mg/dL 0.86 0.82   GLUCOSE mg/dL 112* 88   MAGNESIUM mg/dL 2.1 2.0       CMP   Results from last 7 days   Lab Units 12/21/23  0350 12/20/23  1216   SODIUM mmol/L 139 143   POTASSIUM mmol/L 3.8 4.0   CHLORIDE mmol/L 104 106   CO2 mmol/L 25.0 " 28.0   BUN mg/dL 13 13   CREATININE mg/dL 0.86 0.82   GLUCOSE mg/dL 112* 88   ALBUMIN g/dL  --  4.4   BILIRUBIN mg/dL  --  0.4   ALK PHOS U/L  --  64   AST (SGOT) U/L  --  16   ALT (SGPT) U/L  --  24         BNP        TROPONIN  Results from last 7 days   Lab Units 23  1401 23  1216   CK TOTAL U/L  --  61   HSTROP T ng/L <6 <6       CoAg  Results from last 7 days   Lab Units 23  1216   INR  0.94   APTT seconds 24.8*       Creatinine Clearance  Estimated Creatinine Clearance: 124.1 mL/min (by C-G formula based on SCr of 0.86 mg/dL).    ABG        Radiology  No radiology results for the last day    EKG  Procedures    Stress test  Results for orders placed during the hospital encounter of 23    Stress Test With Myocardial Perfusion One Day    Interpretation Summary    Left ventricular ejection fraction is normal (Calculated EF = 62%).    LEXISCAN CARDIOLITE REPORT    DATE OF PROCEDURE:  23    INDICATION FOR PROCEDURE:  Chest pain    PROCEDURE PERFORMED: Lexiscan Cardiolite    PROCEDURE COMMENTS:    After informed consent was obtained.  Patient's resting heart rate was 81 bpm, resting blood pressure was 166/113, resting EKG revealed sinus rhythm.  Patient was given 0.4 mg of regadenosine for stress testing.  There was no significant change in heart rate, blood pressure, symptoms with regadenoson injection.  Patient tolerated procedure well.  Complications were none.    NUCLEAR IMAGIN.  There was  uniform uptake of Cardiolite both in resting and post stress images, no ischemia seen.  2.  Gated images reveal  normal LV size and contractility, LVEF of 62%.    CONCLUSION:  1.  Lexiscan Cardiolite with  normal perfusion, negative for  ischemia.  2.  Normal wall motion .  LVEF of 62%.    RECOMMENDATIONS:    Clinical correlation recommended.      London Delatorre MD  23  10:03 EST      Echocardiogram      Cardiac catheterization  No results found for this or any previous  visit.          Assessment and Plan       Diagnoses and all orders for this visit:    1. Chest pain (Primary)  Evaluation in the ER with negative troponin and nonischemic ECG.  Nuclear stress test negative for ischemia.  Obtain an echocardiogram to rule out any structural abnormalities in the heart.  I will await the results of Holter monitor for palpitations.  Patient believes he has PVCs.    2. Primary hypertension  Blood pressure is well-controlled.  Continue amlodipine.  We discussed low-salt diet and exercise.    3. Mixed hyperlipidemia  LDL 90, HDL 69, triglyceride 101, total cholesterol 177  Continue atorvastatin 40 mg.    4. Encounter for preventive care  Healthy cardiovascular habits discussed with the patient.    5.  Smoking and alcohol abuse  Smoking cessation counseling provided to the patient  We also discussed cessation of alcohol abuse.     This is my first time seeing this patient and we spent majority of the time in discussing test results, treatment and diagnosis of hypertension, hyperlipidemia and chest pain.  We discussed differential diagnosis of palpitations and chest pain.  Education and counseling regarding smoking and alcohol cessation was provided to the patient during this visit as well.

## 2023-12-22 NOTE — CASE MANAGEMENT/SOCIAL WORK
Case Management Discharge Note      Final Note: Routine home         Selected Continued Care - Discharged on 12/21/2023 Admission date: 12/20/2023 - Discharge disposition: Home or Self Care       Transportation Services  Private: Car    Final Discharge Disposition Code: 01 - home or self-care

## 2023-12-26 ENCOUNTER — TELEPHONE (OUTPATIENT)
Dept: SURGERY | Facility: CLINIC | Age: 48
End: 2023-12-26
Payer: COMMERCIAL

## 2023-12-26 ENCOUNTER — OFFICE VISIT (OUTPATIENT)
Dept: CARDIOLOGY | Facility: CLINIC | Age: 48
End: 2023-12-26
Payer: COMMERCIAL

## 2023-12-26 VITALS
BODY MASS INDEX: 24.92 KG/M2 | SYSTOLIC BLOOD PRESSURE: 117 MMHG | DIASTOLIC BLOOD PRESSURE: 76 MMHG | OXYGEN SATURATION: 96 % | HEART RATE: 101 BPM | WEIGHT: 184 LBS | HEIGHT: 72 IN

## 2023-12-26 DIAGNOSIS — I10 PRIMARY HYPERTENSION: ICD-10-CM

## 2023-12-26 DIAGNOSIS — R07.89 OTHER CHEST PAIN: Primary | ICD-10-CM

## 2023-12-26 DIAGNOSIS — F17.200 TOBACCO USE DISORDER: ICD-10-CM

## 2023-12-26 DIAGNOSIS — Z00.00 ENCOUNTER FOR PREVENTIVE CARE: ICD-10-CM

## 2023-12-26 DIAGNOSIS — E78.2 MIXED HYPERLIPIDEMIA: ICD-10-CM

## 2023-12-26 NOTE — TELEPHONE ENCOUNTER
Called pt regarding referral to schedule new pt appt with Elizabeth Veliz. Pt stated that he would schedule at a later ate due to prviously scheduled appts.    DORA 8:47  12/26/2023

## 2023-12-29 ENCOUNTER — APPOINTMENT (OUTPATIENT)
Dept: CT IMAGING | Facility: HOSPITAL | Age: 48
End: 2023-12-29
Payer: COMMERCIAL

## 2023-12-29 ENCOUNTER — HOSPITAL ENCOUNTER (EMERGENCY)
Facility: HOSPITAL | Age: 48
Discharge: HOME | End: 2023-12-29
Attending: EMERGENCY MEDICINE
Payer: COMMERCIAL

## 2023-12-29 VITALS
TEMPERATURE: 98.5 F | OXYGEN SATURATION: 95 % | WEIGHT: 184 LBS | HEIGHT: 72 IN | DIASTOLIC BLOOD PRESSURE: 88 MMHG | RESPIRATION RATE: 17 BRPM | SYSTOLIC BLOOD PRESSURE: 136 MMHG | HEART RATE: 92 BPM | BODY MASS INDEX: 24.92 KG/M2

## 2023-12-29 DIAGNOSIS — N20.1 URETEROLITHIASIS: ICD-10-CM

## 2023-12-29 DIAGNOSIS — R10.9 LEFT FLANK PAIN: Primary | ICD-10-CM

## 2023-12-29 DIAGNOSIS — E87.6 HYPOKALEMIA: ICD-10-CM

## 2023-12-29 LAB
ALBUMIN SERPL-MCNC: 4.8 G/DL (ref 3.5–5.2)
ALBUMIN/GLOB SERPL: 1.7 G/DL
ALP SERPL-CCNC: 84 U/L (ref 39–117)
ALT SERPL W P-5'-P-CCNC: 35 U/L (ref 1–41)
ANION GAP SERPL CALCULATED.3IONS-SCNC: 17 MMOL/L (ref 5–15)
AST SERPL-CCNC: 26 U/L (ref 1–40)
BACTERIA UR QL AUTO: ABNORMAL /HPF
BASOPHILS # BLD AUTO: 0.1 10*3/MM3 (ref 0–0.2)
BASOPHILS NFR BLD AUTO: 0.6 % (ref 0–1.5)
BILIRUB SERPL-MCNC: 0.6 MG/DL (ref 0–1.2)
BILIRUB UR QL STRIP: NEGATIVE
BUN SERPL-MCNC: 17 MG/DL (ref 6–20)
BUN/CREAT SERPL: 15.7 (ref 7–25)
CALCIUM SPEC-SCNC: 9.9 MG/DL (ref 8.6–10.5)
CHLORIDE SERPL-SCNC: 101 MMOL/L (ref 98–107)
CLARITY UR: CLEAR
CO2 SERPL-SCNC: 23 MMOL/L (ref 22–29)
COLOR UR: ABNORMAL
CREAT SERPL-MCNC: 1.08 MG/DL (ref 0.76–1.27)
DEPRECATED RDW RBC AUTO: 44.2 FL (ref 37–54)
EGFRCR SERPLBLD CKD-EPI 2021: 84.6 ML/MIN/1.73
EOSINOPHIL # BLD AUTO: 0.1 10*3/MM3 (ref 0–0.4)
EOSINOPHIL NFR BLD AUTO: 0.9 % (ref 0.3–6.2)
ERYTHROCYTE [DISTWIDTH] IN BLOOD BY AUTOMATED COUNT: 13.4 % (ref 12.3–15.4)
GLOBULIN UR ELPH-MCNC: 2.8 GM/DL
GLUCOSE SERPL-MCNC: 150 MG/DL (ref 65–99)
GLUCOSE UR STRIP-MCNC: NEGATIVE MG/DL
HCT VFR BLD AUTO: 49.2 % (ref 37.5–51)
HGB BLD-MCNC: 16.7 G/DL (ref 13–17.7)
HGB UR QL STRIP.AUTO: ABNORMAL
HOLD SPECIMEN: NORMAL
HOLD SPECIMEN: NORMAL
HYALINE CASTS UR QL AUTO: ABNORMAL /LPF
KETONES UR QL STRIP: ABNORMAL
LEUKOCYTE ESTERASE UR QL STRIP.AUTO: ABNORMAL
LYMPHOCYTES # BLD AUTO: 1.7 10*3/MM3 (ref 0.7–3.1)
LYMPHOCYTES NFR BLD AUTO: 17.3 % (ref 19.6–45.3)
MCH RBC QN AUTO: 30.5 PG (ref 26.6–33)
MCHC RBC AUTO-ENTMCNC: 33.9 G/DL (ref 31.5–35.7)
MCV RBC AUTO: 89.9 FL (ref 79–97)
MONOCYTES # BLD AUTO: 0.6 10*3/MM3 (ref 0.1–0.9)
MONOCYTES NFR BLD AUTO: 5.8 % (ref 5–12)
NEUTROPHILS NFR BLD AUTO: 7.3 10*3/MM3 (ref 1.7–7)
NEUTROPHILS NFR BLD AUTO: 75.4 % (ref 42.7–76)
NITRITE UR QL STRIP: NEGATIVE
NRBC BLD AUTO-RTO: 0.1 /100 WBC (ref 0–0.2)
PH UR STRIP.AUTO: 6 [PH] (ref 5–8)
PLATELET # BLD AUTO: 297 10*3/MM3 (ref 140–450)
PMV BLD AUTO: 8.2 FL (ref 6–12)
POTASSIUM SERPL-SCNC: 3.3 MMOL/L (ref 3.5–5.2)
PROT SERPL-MCNC: 7.6 G/DL (ref 6–8.5)
PROT UR QL STRIP: ABNORMAL
RBC # BLD AUTO: 5.48 10*6/MM3 (ref 4.14–5.8)
RBC # UR STRIP: ABNORMAL /HPF
REF LAB TEST METHOD: ABNORMAL
SODIUM SERPL-SCNC: 141 MMOL/L (ref 136–145)
SP GR UR STRIP: 1.02 (ref 1–1.03)
SQUAMOUS #/AREA URNS HPF: ABNORMAL /HPF
UROBILINOGEN UR QL STRIP: ABNORMAL
WBC # UR STRIP: ABNORMAL /HPF
WBC NRBC COR # BLD AUTO: 9.7 10*3/MM3 (ref 3.4–10.8)
WHOLE BLOOD HOLD COAG: NORMAL
WHOLE BLOOD HOLD SPECIMEN: NORMAL

## 2023-12-29 PROCEDURE — 85025 COMPLETE CBC W/AUTO DIFF WBC: CPT

## 2023-12-29 PROCEDURE — 81001 URINALYSIS AUTO W/SCOPE: CPT | Performed by: NURSE PRACTITIONER

## 2023-12-29 PROCEDURE — 96374 THER/PROPH/DIAG INJ IV PUSH: CPT

## 2023-12-29 PROCEDURE — 25810000003 SODIUM CHLORIDE 0.9 % SOLUTION: Performed by: NURSE PRACTITIONER

## 2023-12-29 PROCEDURE — 80053 COMPREHEN METABOLIC PANEL: CPT | Performed by: EMERGENCY MEDICINE

## 2023-12-29 PROCEDURE — 25010000002 HYDROMORPHONE 1 MG/ML SOLUTION: Performed by: NURSE PRACTITIONER

## 2023-12-29 PROCEDURE — 96375 TX/PRO/DX INJ NEW DRUG ADDON: CPT

## 2023-12-29 PROCEDURE — 74176 CT ABD & PELVIS W/O CONTRAST: CPT

## 2023-12-29 PROCEDURE — 99284 EMERGENCY DEPT VISIT MOD MDM: CPT

## 2023-12-29 PROCEDURE — 25010000002 KETOROLAC TROMETHAMINE PER 15 MG: Performed by: NURSE PRACTITIONER

## 2023-12-29 PROCEDURE — 25010000002 ONDANSETRON PER 1 MG: Performed by: NURSE PRACTITIONER

## 2023-12-29 RX ORDER — SODIUM CHLORIDE 0.9 % (FLUSH) 0.9 %
10 SYRINGE (ML) INJECTION AS NEEDED
Status: DISCONTINUED | OUTPATIENT
Start: 2023-12-29 | End: 2023-12-29 | Stop reason: HOSPADM

## 2023-12-29 RX ORDER — OXYCODONE AND ACETAMINOPHEN 7.5; 325 MG/1; MG/1
1 TABLET ORAL EVERY 4 HOURS PRN
Qty: 15 TABLET | Refills: 0 | Status: SHIPPED | OUTPATIENT
Start: 2023-12-29

## 2023-12-29 RX ORDER — ONDANSETRON 4 MG/1
4 TABLET, ORALLY DISINTEGRATING ORAL EVERY 8 HOURS PRN
Qty: 15 TABLET | Refills: 0 | Status: SHIPPED | OUTPATIENT
Start: 2023-12-29

## 2023-12-29 RX ORDER — POTASSIUM CHLORIDE 20 MEQ/1
40 TABLET, EXTENDED RELEASE ORAL ONCE
Status: COMPLETED | OUTPATIENT
Start: 2023-12-29 | End: 2023-12-29

## 2023-12-29 RX ORDER — KETOROLAC TROMETHAMINE 30 MG/ML
15 INJECTION, SOLUTION INTRAMUSCULAR; INTRAVENOUS ONCE
Status: COMPLETED | OUTPATIENT
Start: 2023-12-29 | End: 2023-12-29

## 2023-12-29 RX ORDER — ONDANSETRON 2 MG/ML
4 INJECTION INTRAMUSCULAR; INTRAVENOUS ONCE
Status: COMPLETED | OUTPATIENT
Start: 2023-12-29 | End: 2023-12-29

## 2023-12-29 RX ADMIN — POTASSIUM CHLORIDE 40 MEQ: 1500 TABLET, EXTENDED RELEASE ORAL at 11:40

## 2023-12-29 RX ADMIN — KETOROLAC TROMETHAMINE 15 MG: 30 INJECTION, SOLUTION INTRAMUSCULAR; INTRAVENOUS at 09:06

## 2023-12-29 RX ADMIN — SODIUM CHLORIDE 1000 ML: 9 INJECTION, SOLUTION INTRAVENOUS at 08:57

## 2023-12-29 RX ADMIN — HYDROMORPHONE HYDROCHLORIDE 1 MG: 1 INJECTION, SOLUTION INTRAMUSCULAR; INTRAVENOUS; SUBCUTANEOUS at 09:06

## 2023-12-29 RX ADMIN — ONDANSETRON 4 MG: 2 INJECTION INTRAMUSCULAR; INTRAVENOUS at 09:06

## 2023-12-29 NOTE — DISCHARGE INSTRUCTIONS
Take medication as prescribed.  Use caution as pain medication can make you drowsy.  Drink plenty of fluids.  Keep follow-up appointment with urology next week as scheduled.  Return for new or worsening symptoms.

## 2023-12-29 NOTE — ED PROVIDER NOTES
Subjective   History of Present Illness  Patient is a 48-year-old white male with history of hypertension high cholesterol presents today with complaints of left flank pain.  He was admitted here last week for cardiac workup.  He had CT scan obtained at that time and was incidentally found to have a left renal stone measuring 10 mm.  He had urology consultation at that time but was also asymptomatic at that time.  He was scheduled for outpatient lithotripsy next week but states that early this morning he woke from his sleep with severe left flank pain that does not radiate.  It is constant but waxes and wanes in intensity.  No fever nausea or vomiting.  Does report some urinary urgency and is only dribbling some urine.  No hematuria.  No dysuria.      Review of Systems   Constitutional:  Negative for fever.   Gastrointestinal:  Negative for abdominal pain, nausea and vomiting.   Genitourinary:  Positive for flank pain.       Past Medical History:   Diagnosis Date    Acute midline low back pain without sciatica     Impression: He was given Toradol 60mg IM inj and Depo/Decadron IM inj. He was started on Prednisone, Ibuprofen, and Zanaflex.    Chicken pox     Elevated cholesterol     GERD (gastroesophageal reflux disease)     Hernia, abdominal     Hyperlipidemia     Hypertension     Psoriasis     Shingles     Impression: He will be started on Acyclovir, Prednisone and Gabapentin to treat his pain. He was encouraged to RTC if his symptoms did not improve.    Tobacco use disorder     Umbilical hernia        Allergies   Allergen Reactions    Bee Venom Anaphylaxis     anaphylactic reaction to a bee sting    Pollen Extract Unknown - Low Severity       Past Surgical History:   Procedure Laterality Date    COLONOSCOPY N/A 04/19/2023    Procedure: COLONOSCOPY FOR SCREENING with polypectomy;  Surgeon: Eduardo Grande MD;  Location: [facility] [place];  Service: Gastroenterology;  Laterality: N/A;  Ascending Polyp, Cecal Polyp,  Hemorrhoids    EAR TUBES      HERNIA REPAIR Bilateral     Inguinal    UMBILICAL HERNIA REPAIR N/A 11/17/2023    Procedure: UMBILICAL HERNIA REPAIR;  Surgeon: Bay Cooper MD;  Location: [facility] [place];  Service: General;  Laterality: N/A;    VASECTOMY         Family History   Problem Relation Age of Onset    Coronary artery disease Mother     Hypertension Mother     Heart disease Father     Heart attack Father     Coronary artery disease Father     Hypertension Father     Malig Hyperthermia Neg Hx        Social History     Socioeconomic History    Marital status:    Tobacco Use    Smoking status: Some Days     Packs/day: 0.25     Years: 15.00     Additional pack years: 0.00     Total pack years: 3.75     Types: Cigarettes    Smokeless tobacco: Never   Vaping Use    Vaping Use: Never used   Substance and Sexual Activity    Alcohol use: Yes     Comment: SOCIAL USE ONLY    Drug use: Yes     Types: Marijuana     Comment: 2 TIMES PER MONTH    Sexual activity: Yes     Partners: Female           Objective   Physical Exam  Vital signs and triage nurse note reviewed.  Constitutional: Awake, alert; well-developed and well-nourished. No acute distress is noted.  Appears uncomfortable due to pain.  HEENT: Normocephalic, atraumatic; pupils are PERRL with intact EOM; oropharynx is pink and moist without exudate or erythema.  No drooling or pooling of oral secretions.  Neck: Supple, full range of motion without pain; no cervical lymphadenopathy. Normal phonation.  Cardiovascular: Regular rate and rhythm, normal S1-S2.  No murmur noted.  Pulmonary: Respiratory effort regular nonlabored, breath sounds clear to auscultation all fields.  Abdomen: Soft, nontender, nondistended with normoactive bowel sounds; no rebound or guarding.  Mild left CVAT.  No rash.  Musculoskeletal: Independent range of motion of all extremities with no palpable tenderness or edema.  Neuro: Alert oriented x3, speech is clear and appropriate, GCS 15.     Skin: Flesh tone, warm, dry, intact; no erythematous or petechial rash or lesion.    Procedures           ED Course  ED Course as of 12/29/23 1146   Fri Dec 29, 2023   1000 Due to significant overcrowding in the emergency department patient was evaluated by myself in a hallway bed. This exam may be limited by privacy, noise level and the patient not wearing a hospital gown.  Explained to the patient our limitations and our overcrowding.  They were in agreement to continue the exam and treatment at this time.    [MD]   1107 Spoke with Dr. Pantoja, urologist on-call, who states that if patient's pain is not controlled may follow-up as outpatient as scheduled. [MD]      ED Course User Index  [MD] Juanis Bhagat APRN      Labs Reviewed   COMPREHENSIVE METABOLIC PANEL - Abnormal; Notable for the following components:       Result Value    Glucose 150 (*)     Potassium 3.3 (*)     Anion Gap 17.0 (*)     All other components within normal limits    Narrative:     GFR Normal >60  Chronic Kidney Disease <60  Kidney Failure <15     CBC WITH AUTO DIFFERENTIAL - Abnormal; Notable for the following components:    Lymphocyte % 17.3 (*)     Neutrophils, Absolute 7.30 (*)     All other components within normal limits   URINALYSIS W/ MICROSCOPIC IF INDICATED (NO CULTURE) - Abnormal; Notable for the following components:    Color, UA Dark Yellow (*)     Ketones, UA Trace (*)     Blood, UA Large (3+) (*)     Protein, UA 30 mg/dL (1+) (*)     Leuk Esterase, UA Small (1+) (*)     All other components within normal limits   URINALYSIS, MICROSCOPIC ONLY - Abnormal; Notable for the following components:    RBC, UA Too Numerous to Count (*)     WBC, UA 6-10 (*)     All other components within normal limits   RAINBOW DRAW    Narrative:     The following orders were created for panel order [place] Draw.  Procedure                               Abnormality         Status                     ---------                               -----------          ------                     Green Top (Gel)[[id number]]                                  Final result               Lavender Top[[id number]]                                     Final result               Gold Top - SST[[id number]]                                   Final result               Light Blue Top[[id number]]                                   Final result                 Please view results for these tests on the individual orders.   CBC AND DIFFERENTIAL    Narrative:     The following orders were created for panel order CBC & Differential.  Procedure                               Abnormality         Status                     ---------                               -----------         ------                     CBC Auto Differential[[id number]]        Abnormal            Final result                 Please view results for these tests on the individual orders.   GREEN TOP   LAVENDER TOP   GOLD TOP - SST   LIGHT BLUE TOP     CT Abdomen Pelvis Stone Protocol    Result Date: 12/29/2023  Impression: 1.10 mm calculus which was in the renal pelvis on the prior exam is now at the left ureteropelvic junction and appears to be causing obstruction new mild left hydronephrosis. 2.Nonobstructing right renal calculus. 3.3 cm right adrenal mass with attenuation consistent with an adenoma. 4.Small hiatal hernia. 5.Mild soft tissue prominence at the umbilicus. Correlate with exam for evidence of an infectious or inflammatory process. Electronically Signed: Michael Ferreira  12/29/2023 9:44 AM EST  Workstation ID: [id number]   Medications   sodium chloride 0.9 % flush 10 mL (has no administration in time range)   sodium chloride 0.9 % bolus 1,000 mL (0 mL Intravenous Stopped 12/29/23 1005)   ondansetron (ZOFRAN) injection 4 mg (4 mg Intravenous Given 12/29/23 0906)   HYDROmorphone (DILAUDID) injection 1 mg (1 mg Intravenous Given 12/29/23 0906)   ketorolac (TORADOL) injection 15 mg (15 mg Intravenous Given 12/29/23 0906)    potassium chloride (K-DUR,KLOR-CON) CR tablet 40 mEq (40 mEq Oral Given 12/29/23 1140)                                            Medical Decision Making  Patient presents today with the above complaint.    He had the above exam and evaluation.  IV was established.  Labs and CT were obtained.  He was given a liter of IV fluids as well as IV Dilaudid Zofran and Toradol.    Workup: CBC is grossly unremarkable.  Metabolic panel significant for potassium of 3.3.  Urinalysis shows large blood, too numerous to count RBCs and 6-10 WBCs.  CT shows 10 mm calculus which was in the renal pelvis on the prior exam is now at the left UPJ and appears to be causing obstruction with new mild left hydronephrosis.    On reexamination the patient is resting quietly and in no distress.  He has no new complaints.  He reports improvement in his pain and states he is feeling much better.  He is afebrile and hemodynamically stable.  He was given potassium 40 mEq p.o for mild hypokalemia.    Case was discussed with Dr. Pantoja, urologist on-call today, who recommends continued outpatient follow-up if patient's pain is controlled here today and no evidence of infection.    Findings and plan were discussed with the patient who is agreeable.  He will be discharged home with prescriptions for Percocet and Zofran and instructed to keep follow-up appointment next week for lithotripsy as scheduled.  He was given warning signs for prompt return to the ED and voiced understanding.  Inspect reviewed.    Problems Addressed:  Hypokalemia: complicated acute illness or injury  Left flank pain: complicated acute illness or injury  Ureterolithiasis: complicated acute illness or injury    Amount and/or Complexity of Data Reviewed  Labs: ordered.  Radiology: ordered.    Risk  Prescription drug management.        Final diagnoses:   Left flank pain   Ureterolithiasis   Hypokalemia       ED Disposition  ED Disposition       ED Disposition   Discharge     Condition   Stable    Comment   --               Juan Le MD  [address] [place] [address] [place] [address] [phone]    On 1/2/2024  As scheduled         Medication List        New Prescriptions      ondansetron ODT 4 MG disintegrating tablet  Commonly known as: ZOFRAN-ODT  Place 1 tablet on the tongue Every 8 (Eight) Hours As Needed for Nausea or Vomiting.     oxyCODONE-acetaminophen 7.5-325 MG per tablet  Commonly known as: PERCOCET  Take 1 tablet by mouth Every 4 (Four) Hours As Needed for Moderate Pain or Severe Pain.               Where to Get Your Medications        These medications were sent to [id number] - [place], [place] - [address] AT [address] [facility] [address] - [phone]  - [phone] FX  [address] [place] [address]      Phone: [phone]   ondansetron ODT 4 MG disintegrating tablet  oxyCODONE-acetaminophen 7.5-325 MG per tablet            Juanis Bhagat, NATHAN  12/29/23 1146

## 2024-01-05 DIAGNOSIS — E78.2 MIXED HYPERLIPIDEMIA: ICD-10-CM

## 2024-01-05 DIAGNOSIS — I10 ESSENTIAL HYPERTENSION: ICD-10-CM

## 2024-01-08 RX ORDER — OMEPRAZOLE 40 MG/1
40 CAPSULE, DELAYED RELEASE ORAL DAILY
Qty: 90 CAPSULE | Refills: 3 | Status: SHIPPED | OUTPATIENT
Start: 2024-01-08

## 2024-01-08 RX ORDER — ATORVASTATIN CALCIUM 40 MG/1
40 TABLET, FILM COATED ORAL DAILY
Qty: 90 TABLET | Refills: 3 | Status: SHIPPED | OUTPATIENT
Start: 2024-01-08

## 2024-01-08 RX ORDER — AMLODIPINE BESYLATE 10 MG/1
10 TABLET ORAL DAILY
Qty: 90 TABLET | Refills: 2 | Status: SHIPPED | OUTPATIENT
Start: 2024-01-08

## 2024-01-08 NOTE — TELEPHONE ENCOUNTER
Rx Refill Note  Requested Prescriptions     Pending Prescriptions Disp Refills    atorvastatin (LIPITOR) 40 MG tablet 90 tablet 3     Sig: Take 1 tablet by mouth Daily.    amLODIPine (NORVASC) 10 MG tablet 90 tablet 2     Sig: Take 1 tablet by mouth Daily.    omeprazole (priLOSEC) 40 MG capsule       Sig: Take 1 capsule by mouth Daily.      Last office visit with prescribing clinician: 11/27/2023   Last telemedicine visit with prescribing clinician: Visit date not found   Next office visit with prescribing clinician: Visit date not found                         Would you like a call back once the refill request has been completed: [] Yes [] No    If the office needs to give you a call back, can they leave a voicemail: [] Yes [] No    Jorge York MA  01/08/24, 08:15 EST

## 2024-01-09 ENCOUNTER — HOSPITAL ENCOUNTER (OUTPATIENT)
Dept: GENERAL RADIOLOGY | Facility: HOSPITAL | Age: 49
Discharge: HOME | End: 2024-01-09
Admitting: UROLOGY
Payer: COMMERCIAL

## 2024-01-09 ENCOUNTER — TRANSCRIBE ORDERS (OUTPATIENT)
Dept: ADMINISTRATIVE | Facility: HOSPITAL | Age: 49
End: 2024-01-09
Payer: COMMERCIAL

## 2024-01-09 DIAGNOSIS — N20.0 CALCULUS, RENAL: Primary | ICD-10-CM

## 2024-01-09 DIAGNOSIS — N20.0 CALCULUS, RENAL: ICD-10-CM

## 2024-01-09 PROCEDURE — 74018 RADEX ABDOMEN 1 VIEW: CPT

## 2024-01-11 ENCOUNTER — TELEPHONE (OUTPATIENT)
Dept: CARDIOLOGY | Facility: CLINIC | Age: 49
End: 2024-01-11
Payer: COMMERCIAL

## 2024-01-11 NOTE — TELEPHONE ENCOUNTER
FYI for Dr Hernadez, he ordered and echo for the pt but when Fawn at the diagnostic center next door called to schedule the pt he said he is waiting for his follow up with Satya to read the results from his heart monitor and then he will do an appt for his echo the same day.

## 2024-01-11 NOTE — TELEPHONE ENCOUNTER
Please advise patient that his heart monitor results were unremarkable. He should proceed with scheduling echocardiogram.

## 2024-01-12 NOTE — TELEPHONE ENCOUNTER
Called patient to advise of this information and he voiced his full understanding. However, patient reports that he is currently dealing with some Kidney issues and he has to have a laser lithotripsy next week, so he will call to get his echo scheduled sometime after that.

## 2024-01-16 ENCOUNTER — LAB REQUISITION (OUTPATIENT)
Dept: LAB | Facility: HOSPITAL | Age: 49
End: 2024-01-16
Payer: COMMERCIAL

## 2024-01-16 DIAGNOSIS — N20.1 CALCULUS OF URETER: ICD-10-CM

## 2024-01-16 PROCEDURE — 82365 CALCULUS SPECTROSCOPY: CPT | Performed by: UROLOGY

## 2024-01-18 ENCOUNTER — TELEPHONE (OUTPATIENT)
Dept: CARDIOLOGY | Facility: CLINIC | Age: 49
End: 2024-01-18
Payer: COMMERCIAL

## 2024-01-18 NOTE — TELEPHONE ENCOUNTER
Fyi closing out referral for echo tried calling several times and mailed out letter with no response

## 2024-01-22 LAB
CALCIUM OXALATE DIHYDRATE MFR STONE IR: 20 %
COLOR STONE: NORMAL
COM MFR STONE: 80 %
COMPN STONE: NORMAL
LABORATORY COMMENT REPORT: NORMAL
Lab: NORMAL
Lab: NORMAL
PHOTO: NORMAL
SIZE STONE: NORMAL MM
SPEC SOURCE SUBJ: NORMAL
WT STONE: 67 MG

## 2024-01-30 ENCOUNTER — TELEPHONE (OUTPATIENT)
Dept: SURGERY | Facility: CLINIC | Age: 49
End: 2024-01-30
Payer: COMMERCIAL

## 2024-01-30 NOTE — TELEPHONE ENCOUNTER
Called patient regarding new patient appointment on 2/16/24 at 11:15am, patient was agreeable to date and time. Patient stated understanding and was instructed to call the office with any questions or concerns or if reschedule was needed.

## 2024-02-17 DIAGNOSIS — I10 ESSENTIAL HYPERTENSION: ICD-10-CM

## 2024-02-17 DIAGNOSIS — E78.2 MIXED HYPERLIPIDEMIA: ICD-10-CM

## 2024-02-19 RX ORDER — OMEPRAZOLE 40 MG/1
40 CAPSULE, DELAYED RELEASE ORAL DAILY
Qty: 90 CAPSULE | Refills: 3 | Status: SHIPPED | OUTPATIENT
Start: 2024-02-19

## 2024-02-19 RX ORDER — AMLODIPINE BESYLATE 10 MG/1
10 TABLET ORAL DAILY
Qty: 90 TABLET | Refills: 2 | Status: SHIPPED | OUTPATIENT
Start: 2024-02-19

## 2024-02-19 RX ORDER — ATORVASTATIN CALCIUM 40 MG/1
40 TABLET, FILM COATED ORAL DAILY
Qty: 90 TABLET | Refills: 3 | Status: SHIPPED | OUTPATIENT
Start: 2024-02-19

## 2024-02-19 NOTE — TELEPHONE ENCOUNTER
Rx Refill Note  Requested Prescriptions     Pending Prescriptions Disp Refills    atorvastatin (LIPITOR) 40 MG tablet 90 tablet 3     Sig: Take 1 tablet by mouth Daily.    amLODIPine (NORVASC) 10 MG tablet 90 tablet 2     Sig: Take 1 tablet by mouth Daily.    omeprazole (priLOSEC) 40 MG capsule 90 capsule 3     Sig: Take 1 capsule by mouth Daily.      Last office visit with prescribing clinician: 11/27/2023   Last telemedicine visit with prescribing clinician: Visit date not found   Next office visit with prescribing clinician: Visit date not found                         Would you like a call back once the refill request has been completed: [] Yes [] No    If the office needs to give you a call back, can they leave a voicemail: [] Yes [] No    Jorge York MA  02/19/24, 08:26 EST

## 2024-02-23 ENCOUNTER — PATIENT ROUNDING (BHMG ONLY) (OUTPATIENT)
Dept: SURGERY | Facility: CLINIC | Age: 49
End: 2024-02-23
Payer: COMMERCIAL

## 2024-02-23 ENCOUNTER — OFFICE VISIT (OUTPATIENT)
Dept: SURGERY | Facility: CLINIC | Age: 49
End: 2024-02-23
Payer: COMMERCIAL

## 2024-02-23 VITALS
BODY MASS INDEX: 25.19 KG/M2 | OXYGEN SATURATION: 97 % | HEIGHT: 72 IN | WEIGHT: 186 LBS | SYSTOLIC BLOOD PRESSURE: 135 MMHG | DIASTOLIC BLOOD PRESSURE: 81 MMHG | HEART RATE: 84 BPM

## 2024-02-23 DIAGNOSIS — R91.8 PULMONARY NODULES/LESIONS, MULTIPLE: Primary | ICD-10-CM

## 2024-02-23 PROCEDURE — 99204 OFFICE O/P NEW MOD 45 MIN: CPT | Performed by: STUDENT IN AN ORGANIZED HEALTH CARE EDUCATION/TRAINING PROGRAM

## 2024-02-23 RX ORDER — TADALAFIL 10 MG/1
10 TABLET ORAL DAILY PRN
COMMUNITY
Start: 2024-02-16

## 2024-02-23 NOTE — PROGRESS NOTES
"Chief Complaint  Multiple pulmonary nodules noted on CT of the chest.    Subjective        Pio Bazzi II presents to [facility] THORACIC SURGERY  History of Present Illness  Mr. Bazzi is a pleasant 48-year-old gentleman who recently had a episode of kidney stones which he obtained a CTA of the chest during this workup and was found to have 3 pulmonary nodules.  He is a current smoker and roughly smokes half a pack per day and has smoked for 20 years.  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  He denies any respiratory symptoms.  He denies any fevers, chills and/or cough.  He denies any hemoptysis.  He denies any unintentional weight loss.  Objective   Vital Signs:  /81 (BP Location: Left arm, Patient Position: Sitting, Cuff Size: Adult)   Pulse 84   Ht 182.9 cm (72\")   Wt 84.4 kg (186 lb)   SpO2 97%   BMI 25.23 kg/m²   Estimated body mass index is 25.23 kg/m² as calculated from the following:    Height as of this encounter: 182.9 cm (72\").    Weight as of this encounter: 84.4 kg (186 lb).               Physical Exam  Constitutional:       Appearance: Normal appearance.   Cardiovascular:      Rate and Rhythm: Normal rate and regular rhythm.      Heart sounds: Normal heart sounds.   Pulmonary:      Effort: Pulmonary effort is normal.      Breath sounds: Normal breath sounds.   Skin:     Capillary Refill: Capillary refill takes less than 2 seconds.   Neurological:      General: No focal deficit present.      Mental Status: He is alert and oriented to person, place, and time.   Psychiatric:         Mood and Affect: Mood normal.         Behavior: Behavior normal.         Thought Content: Thought content normal.         Judgment: Judgment normal.        Result Review :    CTA of the chest performed on 12/19/2023 was visualized and reviewed by myself.  I personally see a 4 mm " left upper lobe nodule, 6 mm right upper lobe nodule and a 4 mm right middle lobe nodule.  No mediastinal lymphadenopathy was noted.  No other lesions were noted in the chest.           Assessment and Plan     Diagnoses and all orders for this visit:    1. Pulmonary nodules/lesions, multiple (Primary)  -     CT Chest Without Contrast Diagnostic; Future    Mr. Bazzi is a pleasant 48-year-old gentleman who had 3 incidentally found pulmonary nodules on a CT of the chest during workup for a recent episodes of kidney stones.  He is a current smoker and has smoked roughly half a pack per day for 20+ years.  He denies any current respiratory symptoms.  The lesions are very small, all are less than 8 mm in size.  They would not be detected on CT/PET scan.  I would like to obtain a CT scan of the chest in 6 months time for continued surveillance.  Will see him back in office in 6 months with a noncontrasted CT scan of the chest.       I spent 45 minutes caring for Pio on this date of service. This time includes time spent by me in the following activities:preparing for the visit, reviewing tests, obtaining and/or reviewing a separately obtained history, performing a medically appropriate examination and/or evaluation , counseling and educating the patient/family/caregiver, ordering medications, tests, or procedures, referring and communicating with other health care professionals , documenting information in the medical record, independently interpreting results and communicating that information with the patient/family/caregiver, and care coordination  Follow Up     No follow-ups on file.  Patient was given instructions and counseling regarding his condition or for health maintenance advice. Please see specific information pulled into the AVS if appropriate.

## 2024-05-02 NOTE — TELEPHONE ENCOUNTER
Caller: Pio Bazzi II    Relationship: Self    Best call back number: [phone]     Requested Prescriptions:   Requested Prescriptions     Pending Prescriptions Disp Refills    tadalafil (CIALIS) 10 MG tablet       Sig: Take 1 tablet by mouth Daily As Needed for Erectile Dysfunction.        Pharmacy where request should be sent: [id number] - [place], [place] - [address] AT [facility] [address] [facility] [address] [facility] [phone] [facility] [phone]      Last office visit with prescribing clinician: 11/27/2023   Last telemedicine visit with prescribing clinician: Visit date not found   Next office visit with prescribing clinician: Visit date not found     Does the patient have less than a 3 day supply:  [x] Yes  [] No    Would you like a call back once the refill request has been completed: [] Yes [x] No    If the office needs to give you a call back, can they leave a voicemail: [] Yes [x] No    Serena Espinoza Rep   05/02/24 09:12 EDT

## 2024-05-02 NOTE — TELEPHONE ENCOUNTER
Rx Refill Note  Requested Prescriptions     Pending Prescriptions Disp Refills    tadalafil (CIALIS) 10 MG tablet       Sig: Take 1 tablet by mouth Daily As Needed for Erectile Dysfunction.      Last office visit with prescribing clinician: 11/27/2023   Last telemedicine visit with prescribing clinician: Visit date not found   Next office visit with prescribing clinician: Visit date not found                         Would you like a call back once the refill request has been completed: [] Yes [] No    If the office needs to give you a call back, can they leave a voicemail: [] Yes [] No    Serena Steiner Rep  05/02/24, 14:09 EDT

## 2024-05-03 RX ORDER — TADALAFIL 10 MG/1
10 TABLET ORAL DAILY PRN
Qty: 90 TABLET | Refills: 3 | Status: SHIPPED | OUTPATIENT
Start: 2024-05-03

## 2024-07-29 ENCOUNTER — TELEPHONE (OUTPATIENT)
Dept: SURGERY | Facility: CLINIC | Age: 49
End: 2024-07-29
Payer: COMMERCIAL

## 2024-07-29 NOTE — TELEPHONE ENCOUNTER
A Lawrence Mahoney pt that I called to get patient rescheduled with another provider.    DB 11:56  7/29/2024

## 2024-07-29 NOTE — TELEPHONE ENCOUNTER
I CALLED AND LEFT A MESSAGE FOR PATIENT TO CALL BACK AND CHANGE OFFICE APPOINTMENT TO DR. OLIVER ON TUEDAY 8/20 @ 10:15 AM.

## 2024-08-02 ENCOUNTER — TELEPHONE (OUTPATIENT)
Dept: SURGERY | Facility: CLINIC | Age: 49
End: 2024-08-02
Payer: COMMERCIAL

## 2024-08-23 ENCOUNTER — HOSPITAL ENCOUNTER (OUTPATIENT)
Dept: CT IMAGING | Facility: HOSPITAL | Age: 49
Discharge: HOME | End: 2024-08-23
Admitting: STUDENT IN AN ORGANIZED HEALTH CARE EDUCATION/TRAINING PROGRAM
Payer: COMMERCIAL

## 2024-08-23 DIAGNOSIS — R91.8 PULMONARY NODULES/LESIONS, MULTIPLE: ICD-10-CM

## 2024-08-23 PROCEDURE — 71250 CT THORAX DX C-: CPT

## 2024-09-03 DIAGNOSIS — I10 ESSENTIAL HYPERTENSION: ICD-10-CM

## 2024-09-03 DIAGNOSIS — E78.2 MIXED HYPERLIPIDEMIA: ICD-10-CM

## 2024-09-03 RX ORDER — AMLODIPINE BESYLATE 10 MG/1
10 TABLET ORAL DAILY
Qty: 90 TABLET | Refills: 2 | Status: SHIPPED | OUTPATIENT
Start: 2024-09-03

## 2024-09-03 RX ORDER — ATORVASTATIN CALCIUM 40 MG/1
40 TABLET, FILM COATED ORAL DAILY
Qty: 90 TABLET | Refills: 3 | Status: SHIPPED | OUTPATIENT
Start: 2024-09-03

## 2024-09-03 RX ORDER — OMEPRAZOLE 40 MG/1
40 CAPSULE, DELAYED RELEASE ORAL DAILY
Qty: 90 CAPSULE | Refills: 3 | Status: SHIPPED | OUTPATIENT
Start: 2024-09-03

## 2024-09-03 NOTE — TELEPHONE ENCOUNTER
Rx Refill Note  Requested Prescriptions     Pending Prescriptions Disp Refills    atorvastatin (LIPITOR) 40 MG tablet 90 tablet 3     Sig: Take 1 tablet by mouth Daily.    amLODIPine (NORVASC) 10 MG tablet 90 tablet 2     Sig: Take 1 tablet by mouth Daily.    omeprazole (priLOSEC) 40 MG capsule 90 capsule 3     Sig: Take 1 capsule by mouth Daily.      Last office visit with prescribing clinician: 11/27/2023   Last telemedicine visit with prescribing clinician: Visit date not found   Next office visit with prescribing clinician: Visit date not found                         Would you like a call back once the refill request has been completed: [] Yes [] No    If the office needs to give you a call back, can they leave a voicemail: [] Yes [] No    Tammy Hutton MA  09/03/24, 13:14 EDT

## 2024-09-04 ENCOUNTER — OFFICE VISIT (OUTPATIENT)
Dept: SURGERY | Facility: CLINIC | Age: 49
End: 2024-09-04
Payer: COMMERCIAL

## 2024-09-04 VITALS
OXYGEN SATURATION: 99 % | DIASTOLIC BLOOD PRESSURE: 70 MMHG | SYSTOLIC BLOOD PRESSURE: 122 MMHG | WEIGHT: 176.4 LBS | BODY MASS INDEX: 23.92 KG/M2 | HEART RATE: 72 BPM

## 2024-09-04 DIAGNOSIS — R91.8 PULMONARY NODULES/LESIONS, MULTIPLE: Primary | ICD-10-CM

## 2024-09-04 PROCEDURE — 99213 OFFICE O/P EST LOW 20 MIN: CPT | Performed by: THORACIC SURGERY (CARDIOTHORACIC VASCULAR SURGERY)

## 2024-09-04 NOTE — LETTER
"September 10, 2024     Alexey Box Sr., MD  [address]  [address]  [facility] [address]    Patient: Pio Bazzi II   YOB: 1975   Date of Visit: 9/4/2024     Dear Alexey Box Sr., MD:       Thank you for referring Pio Bazzi to me for evaluation. Below are the relevant portions of my assessment and plan of care.    If you have questions, please do not hesitate to call me. I look forward to following Pio along with you.         Sincerely,        Aline Webster MD        CC: No Recipients    Aline Webster MD  09/10/24 1249  Sign when Signing Visit  Chief Complaint  Follow-up (6 month CT follow up )    Subjective       Pio Bazzi II presents to [facility] THORACIC SURGERY  History of Present Illness  Mr. Bazzi is a pleasant 49-year-old gentleman who presents in follow-up for lung nodules.  He is in his usual state of health.  He has decreased the amount of tobacco he has been using.  He is working on quitting.  Objective  Vital Signs:  /70 (BP Location: Left arm, Patient Position: Sitting, Cuff Size: Adult)   Pulse 72   Wt 80 kg (176 lb 6.4 oz)   SpO2 99%   BMI 23.92 kg/m²   Estimated body mass index is 23.92 kg/m² as calculated from the following:    Height as of 2/23/24: 182.9 cm (72\").    Weight as of this encounter: 80 kg (176 lb 6.4 oz).    BMI is within normal parameters. No other follow-up for BMI required.      Physical Exam  Vitals and nursing note reviewed.   Constitutional:       Appearance: He is well-developed.   HENT:      Head: Normocephalic and atraumatic.      Nose: Nose normal.   Eyes:      Conjunctiva/sclera: Conjunctivae normal.   Pulmonary:      Effort: Pulmonary effort is normal.   Musculoskeletal:         General: Normal range of motion.      Cervical back: Normal range of motion and neck supple.   Skin:     General: Skin is warm and dry.   Neurological:      Mental Status: He is alert and oriented to person, place, and time. "   Psychiatric:         Behavior: Behavior normal.         Thought Content: Thought content normal.         Judgment: Judgment normal.        Result Review:          I have independently reviewed the CT of the chest performed on 8/23/2024 which demonstrates stable sub-6 mm lung nodules the largest of which measures 6 mm in the right major fissure.  No new nodules isolated.  No mediastinal or hilar lymphadenopathy.  No pleural or pericardial effusion.     Assessment and Plan   Diagnoses and all orders for this visit:    1. Pulmonary nodules/lesions, multiple (Primary)  -     CT Chest Without Contrast; Future      Mr. Bazzi is a pleasant 49-year-old gentleman who presents in follow-up for his lung nodules.  On his most recent CT scan, his lung nodules are stable.  He will need continued surveillance per Fleischner guidelines with a CT of the chest in 6 months with a follow-up visit.  He will need to complete 3 years of scans given his risk with his smoking history.  After the 3 years of scans, he will need yearly low-dose lung cancer screening scans.    We did discuss smoking cessation today.  He is trying to quit.     I spent 20 minutes caring for Pio on this date of service. This time includes time spent by me in the following activities:preparing for the visit, reviewing tests, obtaining and/or reviewing a separately obtained history, performing a medically appropriate examination and/or evaluation , counseling and educating the patient/family/caregiver, ordering medications, tests, or procedures, documenting information in the medical record, and independently interpreting results and communicating that information with the patient/family/caregiver  Follow Up   No follow-ups on file.  Patient was given instructions and counseling regarding his condition or for health maintenance advice. Please see specific information pulled into the AVS if appropriate.

## 2024-09-10 NOTE — PROGRESS NOTES
"Chief Complaint  Follow-up (6 month CT follow up )    Subjective        Pio Bazzi II presents to [facility] THORACIC SURGERY  History of Present Illness  Mr. Bazzi is a pleasant 49-year-old gentleman who presents in follow-up for lung nodules.  He is in his usual state of health.  He has decreased the amount of tobacco he has been using.  He is working on quitting.  Objective   Vital Signs:  /70 (BP Location: Left arm, Patient Position: Sitting, Cuff Size: Adult)   Pulse 72   Wt 80 kg (176 lb 6.4 oz)   SpO2 99%   BMI 23.92 kg/m²   Estimated body mass index is 23.92 kg/m² as calculated from the following:    Height as of 2/23/24: 182.9 cm (72\").    Weight as of this encounter: 80 kg (176 lb 6.4 oz).    BMI is within normal parameters. No other follow-up for BMI required.      Physical Exam  Vitals and nursing note reviewed.   Constitutional:       Appearance: He is well-developed.   HENT:      Head: Normocephalic and atraumatic.      Nose: Nose normal.   Eyes:      Conjunctiva/sclera: Conjunctivae normal.   Pulmonary:      Effort: Pulmonary effort is normal.   Musculoskeletal:         General: Normal range of motion.      Cervical back: Normal range of motion and neck supple.   Skin:     General: Skin is warm and dry.   Neurological:      Mental Status: He is alert and oriented to person, place, and time.   Psychiatric:         Behavior: Behavior normal.         Thought Content: Thought content normal.         Judgment: Judgment normal.        Result Review :          I have independently reviewed the CT of the chest performed on 8/23/2024 which demonstrates stable sub-6 mm lung nodules the largest of which measures 6 mm in the right major fissure.  No new nodules isolated.  No mediastinal or hilar lymphadenopathy.  No pleural or pericardial effusion.     Assessment and Plan   Diagnoses and all orders for this visit:    1. Pulmonary nodules/lesions, multiple (Primary)  -     CT Chest " Without Contrast; Future      Mr. Bazzi is a pleasant 49-year-old gentleman who presents in follow-up for his lung nodules.  On his most recent CT scan, his lung nodules are stable.  He will need continued surveillance per Fleischner guidelines with a CT of the chest in 6 months with a follow-up visit.  He will need to complete 3 years of scans given his risk with his smoking history.  After the 3 years of scans, he will need yearly low-dose lung cancer screening scans.    We did discuss smoking cessation today.  He is trying to quit.     I spent 20 minutes caring for Pio on this date of service. This time includes time spent by me in the following activities:preparing for the visit, reviewing tests, obtaining and/or reviewing a separately obtained history, performing a medically appropriate examination and/or evaluation , counseling and educating the patient/family/caregiver, ordering medications, tests, or procedures, documenting information in the medical record, and independently interpreting results and communicating that information with the patient/family/caregiver  Follow Up   No follow-ups on file.  Patient was given instructions and counseling regarding his condition or for health maintenance advice. Please see specific information pulled into the AVS if appropriate.

## 2025-02-06 ENCOUNTER — TELEPHONE (OUTPATIENT)
Dept: SURGERY | Facility: CLINIC | Age: 50
End: 2025-02-06
Payer: COMMERCIAL

## 2025-02-06 NOTE — TELEPHONE ENCOUNTER
I CALLED MR. YUEN AND MOVED HIS APPOINTMENT FROM DR. PEREZ ON WED 3/5/35 TO [facility] ON TUESDAY 3/4/25 @ 2:15 PM.  I ALSO REMINDED HIM OF HIS CT CHEST APPOINTMENT.  I ASKED HIM TO CALL BACK WITH ANY QUESTIONS OR IF HE WANTED TO CHANGE THE APPOINTMENT WITH [facility].  I TOLD HIM THE NEW APPOINTMENT DATE AND TIME WILL SHOW UP ON HIS MY CHART ALSO.

## 2025-02-26 ENCOUNTER — HOSPITAL ENCOUNTER (OUTPATIENT)
Dept: CT IMAGING | Facility: HOSPITAL | Age: 50
Discharge: HOME | End: 2025-02-26
Admitting: THORACIC SURGERY (CARDIOTHORACIC VASCULAR SURGERY)
Payer: COMMERCIAL

## 2025-02-26 DIAGNOSIS — R91.8 PULMONARY NODULES/LESIONS, MULTIPLE: ICD-10-CM

## 2025-02-26 PROCEDURE — 71250 CT THORAX DX C-: CPT

## 2025-05-07 ENCOUNTER — TELEPHONE (OUTPATIENT)
Dept: CARDIOLOGY | Facility: CLINIC | Age: 50
End: 2025-05-07
Payer: COMMERCIAL

## 2025-05-07 NOTE — TELEPHONE ENCOUNTER
Caller: ALEXYS MARTINEZ    Relationship: Emergency Contact    Best call back number: [phone]  PLEASE CALL WIFE.  HE WORKS [employer]    What is the best time to reach you: ANYTIME    Who are you requesting to speak with (clinical staff, provider,  specific staff member):         What was the call regarding: PT IS HAVING SOB, PALPITATIONS NO APPTS TILL AUGUST WITH DR MELVIN. PLEASE CALL TO SCHEDULE APPT      UNABLE TO WT

## 2025-05-07 NOTE — TELEPHONE ENCOUNTER
CALLED ALEXYS. OFFERED APPT 5/15 WITH REBECCA.   ALEXYS IS GOING TO CALL OFFICE BACK ONCE SHE LOOKS AT HER CALENDAR.

## 2025-05-09 ENCOUNTER — OFFICE VISIT (OUTPATIENT)
Dept: CARDIOLOGY | Facility: CLINIC | Age: 50
End: 2025-05-09
Payer: COMMERCIAL

## 2025-05-09 VITALS
OXYGEN SATURATION: 98 % | BODY MASS INDEX: 25.6 KG/M2 | WEIGHT: 189 LBS | DIASTOLIC BLOOD PRESSURE: 89 MMHG | SYSTOLIC BLOOD PRESSURE: 136 MMHG | HEART RATE: 87 BPM | HEIGHT: 72 IN

## 2025-05-09 DIAGNOSIS — R00.2 PALPITATIONS: ICD-10-CM

## 2025-05-09 DIAGNOSIS — R06.02 SOB (SHORTNESS OF BREATH): ICD-10-CM

## 2025-05-09 DIAGNOSIS — Z00.00 ENCOUNTER FOR PREVENTIVE CARE: ICD-10-CM

## 2025-05-09 DIAGNOSIS — I10 PRIMARY HYPERTENSION: ICD-10-CM

## 2025-05-09 DIAGNOSIS — E78.2 MIXED HYPERLIPIDEMIA: ICD-10-CM

## 2025-05-09 DIAGNOSIS — F17.200 TOBACCO USE DISORDER: ICD-10-CM

## 2025-05-09 DIAGNOSIS — R07.89 OTHER CHEST PAIN: Primary | ICD-10-CM

## 2025-05-09 NOTE — PROGRESS NOTES
Encounter Date:05/09/2025        Patient ID: Pio Bazzi II is a 49 y.o. male.      Chief Complaint:      Chest Pain   Associated symptoms include numbness and palpitations. Pertinent negatives include no abdominal pain, cough, dizziness, headaches, malaise/fatigue, nausea, shortness of breath, vomiting or weakness.   Palpitations   Associated symptoms include chest pain and numbness. Pertinent negatives include no coughing, dizziness, malaise/fatigue, nausea, shortness of breath, vomiting or weakness.       49 years old man who was evaluated in the ER for intermittent chest pain.  He fell while intoxicated and had a concussion.  He reported upper back pain and intermittent chest pain.  Troponin was negative and ECG did not show any ischemic changes.  A nuclear stress test was negative for ischemia.  He has hypertension and hyperlipidemia.  He also has significant family history for premature coronary disease in first-degree family members.    Current cardiac medications include amlodipine, atorvastatin.    The following portions of the patient's history were reviewed and updated as appropriate: allergies, current medications, past family history, past medical history, past social history, past surgical history, and problem list.    Review of Systems   Constitutional: Negative for malaise/fatigue.   Cardiovascular:  Positive for chest pain and palpitations. Negative for dyspnea on exertion and leg swelling.   Respiratory:  Negative for cough and shortness of breath.    Gastrointestinal:  Negative for abdominal pain, nausea and vomiting.   Neurological:  Positive for numbness. Negative for dizziness, headaches, light-headedness and weakness.   All other systems reviewed and are negative.        Current Outpatient Medications:     amLODIPine (NORVASC) 10 MG tablet, Take 1 tablet by mouth Daily., Disp: 90 tablet, Rfl: 2    atorvastatin (LIPITOR) 40 MG tablet, Take 1 tablet by mouth Daily., Disp: 90 tablet, Rfl: 3     omeprazole (priLOSEC) 40 MG capsule, Take 1 capsule by mouth Daily., Disp: 90 capsule, Rfl: 3    ondansetron ODT (ZOFRAN-ODT) 4 MG disintegrating tablet, Place 1 tablet on the tongue Every 8 (Eight) Hours As Needed for Nausea or Vomiting., Disp: 15 tablet, Rfl: 0    tadalafil (CIALIS) 10 MG tablet, Take 1 tablet by mouth Daily As Needed for Erectile Dysfunction., Disp: 90 tablet, Rfl: 3    oxyCODONE-acetaminophen (PERCOCET) 7.5-325 MG per tablet, Take 1 tablet by mouth Every 4 (Four) Hours As Needed for Moderate Pain or Severe Pain. (Patient not taking: Reported on 5/9/2025), Disp: 15 tablet, Rfl: 0    Current outpatient and discharge medications have been reconciled for the patient.  Reviewed by: Gt Hernadez MD       Allergies   Allergen Reactions    Bee Venom Anaphylaxis     anaphylactic reaction to a bee sting    Pollen Extract Unknown - Low Severity       Family History   Problem Relation Age of Onset    Coronary artery disease Mother     Hypertension Mother     Heart disease Father     Heart attack Father     Coronary artery disease Father     Hypertension Father     Malig Hyperthermia Neg Hx        Past Surgical History:   Procedure Laterality Date    COLONOSCOPY N/A 04/19/2023    Procedure: COLONOSCOPY FOR SCREENING with polypectomy;  Surgeon: Eduardo Grande MD;  Location: [facility] OR;  Service: Gastroenterology;  Laterality: N/A;  Ascending Polyp, Cecal Polyp, Hemorrhoids    EAR TUBES      HERNIA REPAIR Bilateral     Inguinal    UMBILICAL HERNIA REPAIR N/A 11/17/2023    Procedure: UMBILICAL HERNIA REPAIR;  Surgeon: Bay Cooper MD;  Location: [facility] OR [facility];  Service: General;  Laterality: N/A;    VASECTOMY         Past Medical History:   Diagnosis Date    Acute midline low back pain without sciatica     Impression: He was given Toradol 60mg IM inj and Depo/Decadron IM inj. He was started on Prednisone, Ibuprofen, and Zanaflex.    Chicken pox     Elevated cholesterol     GERD (gastroesophageal  "reflux disease)     Hernia, abdominal     Hyperlipidemia     Hypertension     Kidney stone     Psoriasis     Shingles     Impression: He will be started on Acyclovir, Prednisone and Gabapentin to treat his pain. He was encouraged to RTC if his symptoms did not improve.    Tobacco use disorder     Umbilical hernia        Family History   Problem Relation Age of Onset    Coronary artery disease Mother     Hypertension Mother     Heart disease Father     Heart attack Father     Coronary artery disease Father     Hypertension Father     Malig Hyperthermia Neg Hx        Social History     Socioeconomic History    Marital status:    Tobacco Use    Smoking status: Former     Current packs/day: 0.00     Average packs/day: 0.3 packs/day for 15.0 years (3.8 ttl pk-yrs)     Types: Cigarettes     Quit date: 2024     Years since quittin.3     Passive exposure: Past    Smokeless tobacco: Current   Vaping Use    Vaping status: Never Used   Substance and Sexual Activity    Alcohol use: Yes     Comment: SOCIAL USE ONLY    Drug use: Yes     Types: Marijuana     Comment: 2 TIMES PER MONTH    Sexual activity: Yes     Partners: Female               Objective:       Physical Exam    /89 (BP Location: Left arm, Patient Position: Sitting, Cuff Size: Adult)   Pulse 87   Ht 182.9 cm (72\")   Wt 85.7 kg (189 lb)   SpO2 98%   BMI 25.63 kg/m²   The patient is alert, oriented and in no distress.    Vital signs as noted above.    Head and neck revealed no carotid bruits or jugular venous distension.  No thyromegaly or lymphadenopathy is present.    Lungs clear.  No wheezing.  Breath sounds are normal bilaterally.    Heart normal first and second heart sounds.  No murmur..  No pericardial rub is present.  No gallop is present.    Abdomen soft and nontender.  No organomegaly is present.    Extremities revealed good peripheral pulses without any pedal edema.    Skin warm and dry.    Musculoskeletal system is grossly " normal.    CNS grossly normal.           Diagnosis Plan   1. Other chest pain        2. Primary hypertension        3. Mixed hyperlipidemia        4. Encounter for preventive care        5. Tobacco use disorder        LAB RESULTS (LAST 7 DAYS)    CBC        BMP        CMP         BNP        TROPONIN        CoAg        Creatinine Clearance  CrCl cannot be calculated (Patient's most recent lab result is older than the maximum 30 days allowed.).    ABG        Radiology  No radiology results for the last day    EKG    ECG 12 Lead    Date/Time: 2025 9:57 AM  Performed by: Gt Hernadez MD    Authorized by: Gt Hernadez MD  Comparison: compared with previous ECG   Similar to previous ECG  Comments: ECG shows normal sinus rhythm.  Heart rate 66, , QRS 88 and QTc 385 ms          Stress test  Results for orders placed during the hospital encounter of 23    Stress Test With Myocardial Perfusion One Day    Interpretation Summary    Left ventricular ejection fraction is normal (Calculated EF = 62%).    LEXISCAN CARDIOLITE REPORT    DATE OF PROCEDURE:  23    INDICATION FOR PROCEDURE:  Chest pain    PROCEDURE PERFORMED: Lexiscan Cardiolite    PROCEDURE COMMENTS:    After informed consent was obtained.  Patient's resting heart rate was 81 bpm, resting blood pressure was 166/113, resting EKG revealed sinus rhythm.  Patient was given 0.4 mg of regadenosine for stress testing.  There was no significant change in heart rate, blood pressure, symptoms with regadenoson injection.  Patient tolerated procedure well.  Complications were none.    NUCLEAR IMAGIN.  There was  uniform uptake of Cardiolite both in resting and post stress images, no ischemia seen.  2.  Gated images reveal  normal LV size and contractility, LVEF of 62%.    CONCLUSION:  1.  Lexiscan Cardiolite with  normal perfusion, negative for  ischemia.  2.  Normal wall motion .  LVEF of 62%.    RECOMMENDATIONS:    Clinical correlation  recommended.      London Delatorre MD  12/21/23  10:03 EST      Echocardiogram      Cardiac catheterization  No results found for this or any previous visit.          Assessment and Plan       Diagnoses and all orders for this visit:    1. Other chest pain (Primary)    2. Primary hypertension    3. Mixed hyperlipidemia    4. Encounter for preventive care    5. Tobacco use disorder       Palpitations  I will obtain a 14 days Holter monitor  Recommended abstaining from nicotine chewing  Encourage hydration  Reassurance provided  Obtain an echocardiogram to rule out any structural abnormalities  PPI for GERD symptoms    Chest pain   Evaluation in the ER with negative troponin and nonischemic ECG.  Nuclear stress test negative for ischemia.  Obtain an echocardiogram to rule out any structural abnormalities in the heart.  Holter monitor with less than 1% PACs and PVCs     Primary hypertension  Blood pressure is well-controlled.  Continue amlodipine.  We discussed low-salt diet and exercise.     Mixed hyperlipidemia  LDL 90, HDL 69, triglyceride 101, total cholesterol 177  Continue atorvastatin 40 mg.     Encounter for preventive care  Healthy cardiovascular habits discussed with the patient.  Obtain sleep study as patient is complaining of excessive daytime sleepiness, wakes up in the middle of the night gasping for air, shortness of breath at night, snoring.     Smoking and alcohol abuse  He has quit smoking  He chews tobacco  We also discussed cessation of alcohol abuse.

## 2025-05-19 DIAGNOSIS — R07.89 OTHER CHEST PAIN: ICD-10-CM

## 2025-05-19 DIAGNOSIS — I10 PRIMARY HYPERTENSION: ICD-10-CM

## 2025-05-19 DIAGNOSIS — G47.30 SLEEP APNEA, UNSPECIFIED TYPE: Primary | ICD-10-CM

## 2025-06-09 ENCOUNTER — HOSPITAL ENCOUNTER (OUTPATIENT)
Dept: CARDIOLOGY | Facility: HOSPITAL | Age: 50
Discharge: HOME | End: 2025-06-09
Admitting: INTERNAL MEDICINE
Payer: COMMERCIAL

## 2025-06-09 VITALS
DIASTOLIC BLOOD PRESSURE: 88 MMHG | HEART RATE: 86 BPM | BODY MASS INDEX: 25.6 KG/M2 | SYSTOLIC BLOOD PRESSURE: 142 MMHG | HEIGHT: 72 IN | WEIGHT: 189 LBS

## 2025-06-09 LAB
AORTIC DIMENSIONLESS INDEX: 0.95 (DI)
AV MEAN PRESS GRAD SYS DOP V1V2: 2.5 MMHG
AV VMAX SYS DOP: 111.4 CM/SEC
BH CV ECHO LEFT VENTRICLE GLOBAL LONGITUDINAL STRAIN: -17.9 %
BH CV ECHO MEAS - ACS: 2.18 CM
BH CV ECHO MEAS - AO MAX PG: 5 MMHG
BH CV ECHO MEAS - AO ROOT DIAM: 3.5 CM
BH CV ECHO MEAS - AO V2 VTI: 19.8 CM
BH CV ECHO MEAS - AVA(I,D): 3.9 CM2
BH CV ECHO MEAS - EDV(CUBED): 109.6 ML
BH CV ECHO MEAS - EDV(MOD-SP2): 88.3 ML
BH CV ECHO MEAS - EDV(MOD-SP4): 104.2 ML
BH CV ECHO MEAS - EF(MOD-SP2): 49.3 %
BH CV ECHO MEAS - EF(MOD-SP4): 65.3 %
BH CV ECHO MEAS - ESV(CUBED): 35.8 ML
BH CV ECHO MEAS - ESV(MOD-SP2): 44.8 ML
BH CV ECHO MEAS - ESV(MOD-SP4): 36.1 ML
BH CV ECHO MEAS - FS: 31.2 %
BH CV ECHO MEAS - IVS/LVPW: 0.94 CM
BH CV ECHO MEAS - IVSD: 0.88 CM
BH CV ECHO MEAS - LA DIMENSION: 3.5 CM
BH CV ECHO MEAS - LAT PEAK E' VEL: 12.2 CM/SEC
BH CV ECHO MEAS - LV DIASTOLIC VOL/BSA (35-75): 50.1 CM2
BH CV ECHO MEAS - LV MASS(C)D: 148.1 GRAMS
BH CV ECHO MEAS - LV MAX PG: 4.1 MMHG
BH CV ECHO MEAS - LV MEAN PG: 1.77 MMHG
BH CV ECHO MEAS - LV SYSTOLIC VOL/BSA (12-30): 17.4 CM2
BH CV ECHO MEAS - LV V1 MAX: 100.8 CM/SEC
BH CV ECHO MEAS - LV V1 VTI: 18.9 CM
BH CV ECHO MEAS - LVIDD: 4.8 CM
BH CV ECHO MEAS - LVIDS: 3.3 CM
BH CV ECHO MEAS - LVOT AREA: 4 CM2
BH CV ECHO MEAS - LVOT DIAM: 2.27 CM
BH CV ECHO MEAS - LVPWD: 0.93 CM
BH CV ECHO MEAS - MED PEAK E' VEL: 10.9 CM/SEC
BH CV ECHO MEAS - MR MAX PG: 62 MMHG
BH CV ECHO MEAS - MR MAX VEL: 393.7 CM/SEC
BH CV ECHO MEAS - MV A MAX VEL: 55.6 CM/SEC
BH CV ECHO MEAS - MV DEC SLOPE: 706.2 CM/SEC2
BH CV ECHO MEAS - MV DEC TIME: 0.11 SEC
BH CV ECHO MEAS - MV E MAX VEL: 74.8 CM/SEC
BH CV ECHO MEAS - MV E/A: 1.35
BH CV ECHO MEAS - MV MAX PG: 2.8 MMHG
BH CV ECHO MEAS - MV MEAN PG: 1.26 MMHG
BH CV ECHO MEAS - MV V2 VTI: 19.3 CM
BH CV ECHO MEAS - MVA(VTI): 4 CM2
BH CV ECHO MEAS - PA ACC TIME: 0.09 SEC
BH CV ECHO MEAS - PA V2 MAX: 105.6 CM/SEC
BH CV ECHO MEAS - PI END-D VEL: 104.7 CM/SEC
BH CV ECHO MEAS - PULM A REVS DUR: 0.13 SEC
BH CV ECHO MEAS - PULM A REVS VEL: 32.9 CM/SEC
BH CV ECHO MEAS - PULM DIAS VEL: 49.9 CM/SEC
BH CV ECHO MEAS - PULM S/D: 1.11
BH CV ECHO MEAS - PULM SYS VEL: 55.2 CM/SEC
BH CV ECHO MEAS - QP/QS: 1.2
BH CV ECHO MEAS - RAP SYSTOLE: 3 MMHG
BH CV ECHO MEAS - RV MAX PG: 2.37 MMHG
BH CV ECHO MEAS - RV V1 MAX: 77 CM/SEC
BH CV ECHO MEAS - RV V1 VTI: 17.5 CM
BH CV ECHO MEAS - RVDD: 2.9 CM
BH CV ECHO MEAS - RVOT DIAM: 2.6 CM
BH CV ECHO MEAS - RVSP: 24 MMHG
BH CV ECHO MEAS - SV(LVOT): 76.4 ML
BH CV ECHO MEAS - SV(MOD-SP2): 43.6 ML
BH CV ECHO MEAS - SV(MOD-SP4): 68.1 ML
BH CV ECHO MEAS - SV(RVOT): 91.6 ML
BH CV ECHO MEAS - SVI(LVOT): 36.7 ML/M2
BH CV ECHO MEAS - SVI(MOD-SP2): 20.9 ML/M2
BH CV ECHO MEAS - SVI(MOD-SP4): 32.7 ML/M2
BH CV ECHO MEAS - TAPSE (>1.6): 1.8 CM
BH CV ECHO MEAS - TR MAX PG: 21.3 MMHG
BH CV ECHO MEAS - TR MAX VEL: 229.6 CM/SEC
BH CV ECHO MEASUREMENTS AVERAGE E/E' RATIO: 6.48
LV EF BIPLANE MOD: 65 %

## 2025-06-09 PROCEDURE — 93306 TTE W/DOPPLER COMPLETE: CPT | Performed by: INTERNAL MEDICINE

## 2025-06-09 PROCEDURE — 93356 MYOCRD STRAIN IMG SPCKL TRCK: CPT

## 2025-06-09 PROCEDURE — 93306 TTE W/DOPPLER COMPLETE: CPT

## 2025-06-09 PROCEDURE — 93356 MYOCRD STRAIN IMG SPCKL TRCK: CPT | Performed by: INTERNAL MEDICINE

## 2025-06-20 ENCOUNTER — OFFICE VISIT (OUTPATIENT)
Dept: FAMILY MEDICINE CLINIC | Facility: CLINIC | Age: 50
End: 2025-06-20
Payer: COMMERCIAL

## 2025-06-20 VITALS
DIASTOLIC BLOOD PRESSURE: 72 MMHG | HEART RATE: 84 BPM | SYSTOLIC BLOOD PRESSURE: 130 MMHG | BODY MASS INDEX: 25.19 KG/M2 | HEIGHT: 72 IN | WEIGHT: 186 LBS | OXYGEN SATURATION: 98 %

## 2025-06-20 DIAGNOSIS — I10 ESSENTIAL HYPERTENSION: ICD-10-CM

## 2025-06-20 DIAGNOSIS — E78.2 MIXED HYPERLIPIDEMIA: ICD-10-CM

## 2025-06-20 DIAGNOSIS — N52.9 DIFFICULTY ATTAINING ERECTION: ICD-10-CM

## 2025-06-20 DIAGNOSIS — K21.9 GASTROESOPHAGEAL REFLUX DISEASE WITHOUT ESOPHAGITIS: ICD-10-CM

## 2025-06-20 DIAGNOSIS — Z12.5 SCREENING FOR PROSTATE CANCER: ICD-10-CM

## 2025-06-20 DIAGNOSIS — Z00.00 ANNUAL PHYSICAL EXAM: Primary | ICD-10-CM

## 2025-06-20 PROCEDURE — 99396 PREV VISIT EST AGE 40-64: CPT | Performed by: FAMILY MEDICINE

## 2025-06-20 RX ORDER — AMLODIPINE BESYLATE 10 MG/1
10 TABLET ORAL DAILY
Qty: 90 TABLET | Refills: 2 | Status: SHIPPED | OUTPATIENT
Start: 2025-06-20

## 2025-06-20 RX ORDER — ATORVASTATIN CALCIUM 40 MG/1
40 TABLET, FILM COATED ORAL DAILY
Qty: 90 TABLET | Refills: 3 | Status: SHIPPED | OUTPATIENT
Start: 2025-06-20

## 2025-06-20 RX ORDER — OMEPRAZOLE 40 MG/1
40 CAPSULE, DELAYED RELEASE ORAL DAILY
Qty: 90 CAPSULE | Refills: 3 | Status: SHIPPED | OUTPATIENT
Start: 2025-06-20

## 2025-06-20 RX ORDER — TADALAFIL 10 MG/1
10 TABLET ORAL DAILY PRN
Qty: 90 TABLET | Refills: 3 | Status: SHIPPED | OUTPATIENT
Start: 2025-06-20

## 2025-06-20 NOTE — PROGRESS NOTES
"Chief Complaint  Chief Complaint   Patient presents with    Annual Exam     Physical        Subjective    History of Present Illness        Pio Bazzi II presents to [facility] for   History of Present Illness  Pio Bazzi II is a 49 y.o. male here for his annual physical with me. Pio is here for coordination of medical care, to discuss health maintenance, disease prevention as well as to followup on medical problems. Patient has been followed by me since 2017. Patient's last CPE was 2024. Activity level is moderate. Exercises 3 per week. Appetite is good. Feels well with few complaints. Energy level is good. Sleeps fairly well. Patient's last colonoscopy was 2023. He is advised to repeat in 5 years.      History of Present Illness      Objective   Vital Signs:   Visit Vitals  /72   Pulse 84   Ht 182.9 cm (72\")   Wt 84.4 kg (186 lb)   SpO2 98%   BMI 25.23 kg/m²          BMI is >= 25 and <30. (Overweight) The following options were offered after discussion;: exercise counseling/recommendations and nutrition counseling/recommendations     Physical Exam  Vitals reviewed.   Constitutional:       General: He is not in acute distress.     Appearance: Normal appearance. He is well-developed and normal weight. He is not ill-appearing.   HENT:      Head: Normocephalic and atraumatic.      Right Ear: Tympanic membrane, ear canal and external ear normal.      Left Ear: Tympanic membrane, ear canal and external ear normal.      Nose: Nose normal.      Mouth/Throat:      Mouth: Mucous membranes are moist.      Pharynx: Oropharynx is clear. No posterior oropharyngeal erythema.   Eyes:      General: Lids are normal.      Extraocular Movements: Extraocular movements intact.      Conjunctiva/sclera: Conjunctivae normal.      Pupils: Pupils are equal, round, and reactive to light.   Cardiovascular:      Rate and Rhythm: Normal rate and regular rhythm.      Pulses: Normal pulses.      " Heart sounds: Normal heart sounds.   Pulmonary:      Effort: Pulmonary effort is normal. No respiratory distress.      Breath sounds: Normal breath sounds. No stridor.   Abdominal:      General: Abdomen is flat. Bowel sounds are normal.      Palpations: Abdomen is soft.   Musculoskeletal:         General: Normal range of motion.      Cervical back: Normal range of motion and neck supple.   Skin:     General: Skin is warm and dry.      Capillary Refill: Capillary refill takes less than 2 seconds.   Neurological:      General: No focal deficit present.      Mental Status: He is alert and oriented to person, place, and time. Mental status is at baseline.      Cranial Nerves: No cranial nerve deficit.      Sensory: No sensory deficit.      Motor: No weakness.      Coordination: Coordination normal.      Gait: Gait normal.      Deep Tendon Reflexes: Reflexes normal.   Psychiatric:         Mood and Affect: Mood normal.         Behavior: Behavior normal.         Thought Content: Thought content normal.         Judgment: Judgment normal.        Physical Exam           Result Review :  Results                            Assessment and Plan      Diagnoses and all orders for this visit:    1. Annual physical exam (Primary)  Assessment & Plan:  Discussed injury prevention, diet and exercise, safe sexual practices, and screening for common diseases. Encouraged use of sunscreen and seatbelts. Avoidance of tobacco encouraged. Limitation or avoidance of alcohol encouraged. Recommend yearly dental and eye exams. Also discussed monitoring of blood pressure, lipids.    Orders:  -     CBC & Differential  -     Comprehensive Metabolic Panel  -     TSH  -     Lipid Panel With / Chol / HDL Ratio  -     UA / M With / Rflx Culture([facility] ONLY) - Urine, Clean Catch  -     Hepatitis C RNA, Quantitative, PCR (graph)    2. Screening for prostate cancer  -     PSA Screen    3. Mixed hyperlipidemia  -     atorvastatin (LIPITOR) 40 MG tablet;  Take 1 tablet by mouth Daily.  Dispense: 90 tablet; Refill: 3    4. Essential hypertension  -     amLODIPine (NORVASC) 10 MG tablet; Take 1 tablet by mouth Daily.  Dispense: 90 tablet; Refill: 2    5. Difficulty attaining erection  -     tadalafil (CIALIS) 10 MG tablet; Take 1 tablet by mouth Daily As Needed for Erectile Dysfunction.  Dispense: 90 tablet; Refill: 3    6. Gastroesophageal reflux disease without esophagitis  -     omeprazole (priLOSEC) 40 MG capsule; Take 1 capsule by mouth Daily.  Dispense: 90 capsule; Refill: 3       Assessment & Plan             Follow Up   No follow-ups on file.  Patient was given instructions and counseling regarding his condition or for health maintenance advice. Please see specific information pulled into the AVS if appropriate.

## 2025-07-21 DIAGNOSIS — K21.9 GASTROESOPHAGEAL REFLUX DISEASE WITHOUT ESOPHAGITIS: ICD-10-CM

## 2025-07-21 DIAGNOSIS — N52.9 DIFFICULTY ATTAINING ERECTION: ICD-10-CM

## 2025-07-21 DIAGNOSIS — I10 ESSENTIAL HYPERTENSION: ICD-10-CM

## 2025-07-21 DIAGNOSIS — E78.2 MIXED HYPERLIPIDEMIA: ICD-10-CM

## 2025-07-22 RX ORDER — AMLODIPINE BESYLATE 10 MG/1
10 TABLET ORAL DAILY
Qty: 90 TABLET | Refills: 3 | Status: SHIPPED | OUTPATIENT
Start: 2025-07-22

## 2025-07-22 RX ORDER — ATORVASTATIN CALCIUM 40 MG/1
40 TABLET, FILM COATED ORAL DAILY
Qty: 90 TABLET | Refills: 3 | Status: SHIPPED | OUTPATIENT
Start: 2025-07-22

## 2025-07-22 RX ORDER — OMEPRAZOLE 40 MG/1
40 CAPSULE, DELAYED RELEASE ORAL DAILY
Qty: 90 CAPSULE | Refills: 3 | Status: SHIPPED | OUTPATIENT
Start: 2025-07-22

## 2025-07-22 RX ORDER — TADALAFIL 10 MG/1
10 TABLET ORAL DAILY PRN
Qty: 90 TABLET | Refills: 3 | Status: SHIPPED | OUTPATIENT
Start: 2025-07-22

## 2025-07-22 NOTE — TELEPHONE ENCOUNTER
Rx Refill Note  Requested Prescriptions     Pending Prescriptions Disp Refills    atorvastatin (LIPITOR) 40 MG tablet 90 tablet 3     Sig: Take 1 tablet by mouth Daily.    tadalafil (CIALIS) 10 MG tablet 90 tablet 3     Sig: Take 1 tablet by mouth Daily As Needed for Erectile Dysfunction.    omeprazole (priLOSEC) 40 MG capsule 90 capsule 3     Sig: Take 1 capsule by mouth Daily.    amLODIPine (NORVASC) 10 MG tablet 90 tablet 2     Sig: Take 1 tablet by mouth Daily.      Last office visit with prescribing clinician: 6/20/2025   Last telemedicine visit with prescribing clinician: Visit date not found   Next office visit with prescribing clinician: Visit date not found                         Would you like a call back once the refill request has been completed: [] Yes [] No    If the office needs to give you a call back, can they leave a voicemail: [] Yes [] No    Tammy Hutton MA  07/22/25, 09:50 EDT

## 2025-08-06 PROBLEM — I10 PRIMARY HYPERTENSION: Chronic | Status: ACTIVE | Noted: 2021-06-30

## 2025-08-07 ENCOUNTER — OFFICE VISIT (OUTPATIENT)
Dept: CARDIOLOGY | Facility: CLINIC | Age: 50
End: 2025-08-07
Payer: COMMERCIAL

## 2025-08-07 VITALS
SYSTOLIC BLOOD PRESSURE: 117 MMHG | OXYGEN SATURATION: 98 % | DIASTOLIC BLOOD PRESSURE: 78 MMHG | WEIGHT: 184 LBS | HEIGHT: 72 IN | HEART RATE: 79 BPM | BODY MASS INDEX: 24.92 KG/M2

## 2025-08-07 DIAGNOSIS — R00.2 PALPITATIONS: Primary | ICD-10-CM

## 2025-08-07 DIAGNOSIS — E78.2 MIXED HYPERLIPIDEMIA: Chronic | ICD-10-CM

## 2025-08-07 DIAGNOSIS — F17.200 NICOTINE USE DISORDER: ICD-10-CM

## 2025-08-07 DIAGNOSIS — I10 PRIMARY HYPERTENSION: Chronic | ICD-10-CM

## 2025-08-07 PROCEDURE — 99214 OFFICE O/P EST MOD 30 MIN: CPT | Performed by: NURSE PRACTITIONER

## (undated) DEVICE — Device

## (undated) DEVICE — ADHS SKIN SURG TISS VISC PREMIERPRO EXOFIN HI/VISC FAST/DRY

## (undated) DEVICE — SINGLE-USE BIOPSY FORCEPS: Brand: RADIAL JAW 4

## (undated) DEVICE — PATIENT RETURN ELECTRODE, SINGLE-USE, CONTACT QUALITY MONITORING, ADULT, WITH 9FT CORD, FOR PATIENTS WEIGING OVER 33LBS. (15KG): Brand: MEGADYNE

## (undated) DEVICE — FLEX ADVANTAGE 1500CC: Brand: FLEX ADVANTAGE

## (undated) DEVICE — CANN NASL CO2 TRULINK W/O2 A/

## (undated) DEVICE — SUT VIC 3/0 TIES 18IN J110T

## (undated) DEVICE — SKIN PREP TRAY W/CHG: Brand: MEDLINE INDUSTRIES, INC.

## (undated) DEVICE — SUT VIC 3/0 SH 27IN J416H

## (undated) DEVICE — TBG PENCL TELESCP MEGADYNE SMOKE EVAC 10FT

## (undated) DEVICE — GOWN ,SIRUS,NONREINFORCED 3XL: Brand: MEDLINE

## (undated) DEVICE — SYRINGE, LUER SLIP, STERILE, 60ML: Brand: MEDLINE

## (undated) DEVICE — VIAL FORMLN CAP 10PCT 20ML

## (undated) DEVICE — SUT ETHIB 0/0 MO6 I8IN CX45D

## (undated) DEVICE — PK PROC MINOR TOWER 40

## (undated) DEVICE — GLV SURG PREMIERPRO ORTHO LTX PF SZ7.5 BRN

## (undated) DEVICE — TRAP FLD MINIVAC MEGADYNE 100ML

## (undated) DEVICE — SUT VIC 5/0 PS2 18IN J495H

## (undated) DEVICE — KT ORCA ORCAPOD DISP STRL

## (undated) DEVICE — GOWN ISOL W/THUMB UNIV BLU BX/15